# Patient Record
Sex: FEMALE | Race: OTHER | NOT HISPANIC OR LATINO | Employment: UNEMPLOYED | URBAN - METROPOLITAN AREA
[De-identification: names, ages, dates, MRNs, and addresses within clinical notes are randomized per-mention and may not be internally consistent; named-entity substitution may affect disease eponyms.]

---

## 2017-07-20 ENCOUNTER — CONVERSION ENCOUNTER (OUTPATIENT)
Dept: MAMMOGRAPHY | Facility: CLINIC | Age: 67
End: 2017-07-20

## 2019-03-20 ENCOUNTER — TRANSCRIBE ORDERS (OUTPATIENT)
Dept: ADMINISTRATIVE | Facility: HOSPITAL | Age: 69
End: 2019-03-20

## 2019-03-20 ENCOUNTER — APPOINTMENT (OUTPATIENT)
Dept: LAB | Facility: HOSPITAL | Age: 69
End: 2019-03-20
Payer: COMMERCIAL

## 2019-03-20 DIAGNOSIS — Z00.00 ROUTINE GENERAL MEDICAL EXAMINATION AT A HEALTH CARE FACILITY: ICD-10-CM

## 2019-03-20 DIAGNOSIS — Z00.00 ROUTINE GENERAL MEDICAL EXAMINATION AT A HEALTH CARE FACILITY: Primary | ICD-10-CM

## 2019-03-20 LAB
25(OH)D3 SERPL-MCNC: 39.1 NG/ML (ref 30–100)
ALBUMIN SERPL BCP-MCNC: 4.1 G/DL (ref 3–5.2)
ALP SERPL-CCNC: 58 U/L (ref 43–122)
ALT SERPL W P-5'-P-CCNC: 33 U/L (ref 9–52)
ANION GAP SERPL CALCULATED.3IONS-SCNC: 7 MMOL/L (ref 5–14)
AST SERPL W P-5'-P-CCNC: 26 U/L (ref 14–36)
BASOPHILS # BLD AUTO: 0.1 THOUSANDS/ΜL (ref 0–0.1)
BASOPHILS NFR BLD AUTO: 1 % (ref 0–1)
BILIRUB SERPL-MCNC: 0.9 MG/DL
BUN SERPL-MCNC: 12 MG/DL (ref 5–25)
CALCIUM SERPL-MCNC: 9.5 MG/DL (ref 8.4–10.2)
CHLORIDE SERPL-SCNC: 104 MMOL/L (ref 97–108)
CHOLEST SERPL-MCNC: 191 MG/DL
CO2 SERPL-SCNC: 28 MMOL/L (ref 22–30)
CREAT SERPL-MCNC: 0.47 MG/DL (ref 0.6–1.2)
EOSINOPHIL # BLD AUTO: 0.2 THOUSAND/ΜL (ref 0–0.4)
EOSINOPHIL NFR BLD AUTO: 3 % (ref 0–6)
ERYTHROCYTE [DISTWIDTH] IN BLOOD BY AUTOMATED COUNT: 13 %
GFR SERPL CREATININE-BSD FRML MDRD: 102 ML/MIN/1.73SQ M
GLUCOSE P FAST SERPL-MCNC: 99 MG/DL (ref 70–99)
HCT VFR BLD AUTO: 39.2 % (ref 36–46)
HDLC SERPL-MCNC: 45 MG/DL (ref 40–59)
HGB BLD-MCNC: 12.8 G/DL (ref 12–16)
LDLC SERPL CALC-MCNC: 119 MG/DL
LYMPHOCYTES # BLD AUTO: 1.8 THOUSANDS/ΜL (ref 0.5–4)
LYMPHOCYTES NFR BLD AUTO: 29 % (ref 25–45)
MCH RBC QN AUTO: 28.5 PG (ref 26–34)
MCHC RBC AUTO-ENTMCNC: 32.7 G/DL (ref 31–36)
MCV RBC AUTO: 87 FL (ref 80–100)
MONOCYTES # BLD AUTO: 0.4 THOUSAND/ΜL (ref 0.2–0.9)
MONOCYTES NFR BLD AUTO: 6 % (ref 1–10)
NEUTROPHILS # BLD AUTO: 3.8 THOUSANDS/ΜL (ref 1.8–7.8)
NEUTS SEG NFR BLD AUTO: 62 % (ref 45–65)
NONHDLC SERPL-MCNC: 146 MG/DL
PLATELET # BLD AUTO: 251 THOUSANDS/UL (ref 150–450)
PMV BLD AUTO: 8.5 FL (ref 8.9–12.7)
POTASSIUM SERPL-SCNC: 4.1 MMOL/L (ref 3.6–5)
PROT SERPL-MCNC: 7 G/DL (ref 5.9–8.4)
RBC # BLD AUTO: 4.5 MILLION/UL (ref 4–5.2)
SODIUM SERPL-SCNC: 139 MMOL/L (ref 137–147)
TRIGL SERPL-MCNC: 133 MG/DL
WBC # BLD AUTO: 6.1 THOUSAND/UL (ref 4.5–11)

## 2019-03-20 PROCEDURE — 82306 VITAMIN D 25 HYDROXY: CPT

## 2019-03-20 PROCEDURE — 85025 COMPLETE CBC W/AUTO DIFF WBC: CPT

## 2019-03-20 PROCEDURE — 80053 COMPREHEN METABOLIC PANEL: CPT

## 2019-03-20 PROCEDURE — 36415 COLL VENOUS BLD VENIPUNCTURE: CPT

## 2019-03-20 PROCEDURE — 80061 LIPID PANEL: CPT

## 2019-05-30 ENCOUNTER — TRANSCRIBE ORDERS (OUTPATIENT)
Dept: ADMINISTRATIVE | Facility: HOSPITAL | Age: 69
End: 2019-05-30

## 2019-05-30 DIAGNOSIS — Z12.39 BREAST SCREENING, UNSPECIFIED: Primary | ICD-10-CM

## 2019-06-10 ENCOUNTER — HOSPITAL ENCOUNTER (OUTPATIENT)
Dept: MAMMOGRAPHY | Facility: CLINIC | Age: 69
Discharge: HOME | End: 2019-06-10
Payer: COMMERCIAL

## 2019-06-10 VITALS — BODY MASS INDEX: 30.77 KG/M2 | WEIGHT: 156.75 LBS | HEIGHT: 60 IN

## 2019-06-10 DIAGNOSIS — Z12.39 BREAST SCREENING, UNSPECIFIED: ICD-10-CM

## 2019-06-10 PROCEDURE — 77067 SCR MAMMO BI INCL CAD: CPT

## 2019-08-12 PROBLEM — I10 HYPERTENSION: Status: ACTIVE | Noted: 2017-09-11

## 2020-01-13 PROBLEM — R41.3 MEMORY CHANGE: Status: ACTIVE | Noted: 2020-01-13

## 2020-01-13 PROBLEM — R94.31 ABNORMAL EKG: Status: ACTIVE | Noted: 2017-09-11

## 2020-01-16 ENCOUNTER — LAB (OUTPATIENT)
Dept: LAB | Facility: HOSPITAL | Age: 70
End: 2020-01-16
Payer: COMMERCIAL

## 2020-01-16 ENCOUNTER — TRANSCRIBE ORDERS (OUTPATIENT)
Dept: ADMINISTRATIVE | Facility: HOSPITAL | Age: 70
End: 2020-01-16

## 2020-01-16 DIAGNOSIS — I10 ESSENTIAL HYPERTENSION: ICD-10-CM

## 2020-01-16 LAB
ALBUMIN SERPL BCP-MCNC: 3.8 G/DL (ref 3–5.2)
ALP SERPL-CCNC: 55 U/L (ref 43–122)
ALT SERPL W P-5'-P-CCNC: 27 U/L (ref 9–52)
ANION GAP SERPL CALCULATED.3IONS-SCNC: 9 MMOL/L (ref 5–14)
AST SERPL W P-5'-P-CCNC: 24 U/L (ref 14–36)
BASOPHILS # BLD AUTO: 0.1 THOUSANDS/ΜL (ref 0–0.1)
BASOPHILS NFR BLD AUTO: 1 % (ref 0–1)
BILIRUB SERPL-MCNC: 1 MG/DL
BUN SERPL-MCNC: 13 MG/DL (ref 5–25)
CALCIUM SERPL-MCNC: 9.2 MG/DL (ref 8.4–10.2)
CHLORIDE SERPL-SCNC: 105 MMOL/L (ref 97–108)
CHOLEST SERPL-MCNC: 211 MG/DL
CO2 SERPL-SCNC: 25 MMOL/L (ref 22–30)
CREAT SERPL-MCNC: 0.56 MG/DL (ref 0.6–1.2)
EOSINOPHIL # BLD AUTO: 0.2 THOUSAND/ΜL (ref 0–0.4)
EOSINOPHIL NFR BLD AUTO: 4 % (ref 0–6)
ERYTHROCYTE [DISTWIDTH] IN BLOOD BY AUTOMATED COUNT: 13.5 %
GFR SERPL CREATININE-BSD FRML MDRD: 95 ML/MIN/1.73SQ M
GLUCOSE P FAST SERPL-MCNC: 105 MG/DL (ref 70–99)
HCT VFR BLD AUTO: 38.8 % (ref 36–46)
HDLC SERPL-MCNC: 53 MG/DL
HGB BLD-MCNC: 12.7 G/DL (ref 12–16)
LDLC SERPL CALC-MCNC: 131 MG/DL
LYMPHOCYTES # BLD AUTO: 1.8 THOUSANDS/ΜL (ref 0.5–4)
LYMPHOCYTES NFR BLD AUTO: 30 % (ref 25–45)
MCH RBC QN AUTO: 28.5 PG (ref 26–34)
MCHC RBC AUTO-ENTMCNC: 32.8 G/DL (ref 31–36)
MCV RBC AUTO: 87 FL (ref 80–100)
MONOCYTES # BLD AUTO: 0.3 THOUSAND/ΜL (ref 0.2–0.9)
MONOCYTES NFR BLD AUTO: 6 % (ref 1–10)
NEUTROPHILS # BLD AUTO: 3.7 THOUSANDS/ΜL (ref 1.8–7.8)
NEUTS SEG NFR BLD AUTO: 60 % (ref 45–65)
NONHDLC SERPL-MCNC: 158 MG/DL
PLATELET # BLD AUTO: 247 THOUSANDS/UL (ref 150–450)
PMV BLD AUTO: 8.5 FL (ref 8.9–12.7)
POTASSIUM SERPL-SCNC: 4.2 MMOL/L (ref 3.6–5)
PROT SERPL-MCNC: 6.7 G/DL (ref 5.9–8.4)
RBC # BLD AUTO: 4.46 MILLION/UL (ref 4–5.2)
SODIUM SERPL-SCNC: 139 MMOL/L (ref 137–147)
TRIGL SERPL-MCNC: 135 MG/DL
WBC # BLD AUTO: 6.1 THOUSAND/UL (ref 4.5–11)

## 2020-01-16 PROCEDURE — 85025 COMPLETE CBC W/AUTO DIFF WBC: CPT

## 2020-01-16 PROCEDURE — 36415 COLL VENOUS BLD VENIPUNCTURE: CPT

## 2020-01-16 PROCEDURE — 80053 COMPREHEN METABOLIC PANEL: CPT

## 2020-01-16 PROCEDURE — 80061 LIPID PANEL: CPT

## 2020-06-23 ENCOUNTER — HOSPITAL ENCOUNTER (OUTPATIENT)
Dept: RADIOLOGY | Facility: HOSPITAL | Age: 70
Discharge: HOME | End: 2020-06-23
Payer: COMMERCIAL

## 2020-06-23 DIAGNOSIS — M79.672 LEFT FOOT PAIN: ICD-10-CM

## 2020-06-23 PROCEDURE — 73630 X-RAY EXAM OF FOOT: CPT

## 2020-08-14 ENCOUNTER — LAB REQUISITION (OUTPATIENT)
Dept: LAB | Facility: HOSPITAL | Age: 70
End: 2020-08-14
Payer: COMMERCIAL

## 2020-08-14 DIAGNOSIS — I10 ESSENTIAL (PRIMARY) HYPERTENSION: ICD-10-CM

## 2020-08-14 DIAGNOSIS — R73.9 HYPERGLYCEMIA, UNSPECIFIED: ICD-10-CM

## 2020-08-14 DIAGNOSIS — E78.2 MIXED HYPERLIPIDEMIA: ICD-10-CM

## 2020-08-14 LAB
ALBUMIN SERPL BCP-MCNC: 3.8 G/DL (ref 3.5–5)
ALP SERPL-CCNC: 61 U/L (ref 46–116)
ALT SERPL W P-5'-P-CCNC: 24 U/L (ref 12–78)
ANION GAP SERPL CALCULATED.3IONS-SCNC: 4 MMOL/L (ref 4–13)
AST SERPL W P-5'-P-CCNC: 17 U/L (ref 5–45)
BASOPHILS # BLD AUTO: 0.06 THOUSANDS/ΜL (ref 0–0.1)
BASOPHILS NFR BLD AUTO: 1 % (ref 0–1)
BILIRUB SERPL-MCNC: 0.88 MG/DL (ref 0.2–1)
BUN SERPL-MCNC: 14 MG/DL (ref 5–25)
CALCIUM SERPL-MCNC: 8.7 MG/DL (ref 8.3–10.1)
CHLORIDE SERPL-SCNC: 110 MMOL/L (ref 100–108)
CHOLEST SERPL-MCNC: 217 MG/DL (ref 50–200)
CK SERPL-CCNC: 125 U/L (ref 26–192)
CO2 SERPL-SCNC: 29 MMOL/L (ref 21–32)
CREAT SERPL-MCNC: 0.66 MG/DL (ref 0.6–1.3)
EOSINOPHIL # BLD AUTO: 0.2 THOUSAND/ΜL (ref 0–0.61)
EOSINOPHIL NFR BLD AUTO: 3 % (ref 0–6)
ERYTHROCYTE [DISTWIDTH] IN BLOOD BY AUTOMATED COUNT: 13.1 % (ref 11.6–15.1)
EST. AVERAGE GLUCOSE BLD GHB EST-MCNC: 111 MG/DL
GFR SERPL CREATININE-BSD FRML MDRD: 90 ML/MIN/1.73SQ M
GLUCOSE SERPL-MCNC: 99 MG/DL (ref 65–140)
HBA1C MFR BLD: 5.5 %
HCT VFR BLD AUTO: 39.5 % (ref 34.8–46.1)
HDLC SERPL-MCNC: 61 MG/DL
HGB BLD-MCNC: 12.6 G/DL (ref 11.5–15.4)
IMM GRANULOCYTES # BLD AUTO: 0.02 THOUSAND/UL (ref 0–0.2)
IMM GRANULOCYTES NFR BLD AUTO: 0 % (ref 0–2)
LDLC SERPL CALC-MCNC: 131 MG/DL (ref 0–100)
LYMPHOCYTES # BLD AUTO: 2.07 THOUSANDS/ΜL (ref 0.6–4.47)
LYMPHOCYTES NFR BLD AUTO: 31 % (ref 14–44)
MCH RBC QN AUTO: 28.7 PG (ref 26.8–34.3)
MCHC RBC AUTO-ENTMCNC: 31.9 G/DL (ref 31.4–37.4)
MCV RBC AUTO: 90 FL (ref 82–98)
MONOCYTES # BLD AUTO: 0.43 THOUSAND/ΜL (ref 0.17–1.22)
MONOCYTES NFR BLD AUTO: 6 % (ref 4–12)
NEUTROPHILS # BLD AUTO: 3.89 THOUSANDS/ΜL (ref 1.85–7.62)
NEUTS SEG NFR BLD AUTO: 59 % (ref 43–75)
NONHDLC SERPL-MCNC: 156 MG/DL
NRBC BLD AUTO-RTO: 0 /100 WBCS
PLATELET # BLD AUTO: 247 THOUSANDS/UL (ref 149–390)
PMV BLD AUTO: 10.4 FL (ref 8.9–12.7)
POTASSIUM SERPL-SCNC: 4.5 MMOL/L (ref 3.5–5.3)
PROT SERPL-MCNC: 6.8 G/DL (ref 6.4–8.2)
RBC # BLD AUTO: 4.39 MILLION/UL (ref 3.81–5.12)
SODIUM SERPL-SCNC: 143 MMOL/L (ref 136–145)
TRIGL SERPL-MCNC: 124 MG/DL
WBC # BLD AUTO: 6.67 THOUSAND/UL (ref 4.31–10.16)

## 2020-08-14 PROCEDURE — 82550 ASSAY OF CK (CPK): CPT | Performed by: FAMILY MEDICINE

## 2020-08-14 PROCEDURE — 83036 HEMOGLOBIN GLYCOSYLATED A1C: CPT | Performed by: FAMILY MEDICINE

## 2020-08-14 PROCEDURE — 80053 COMPREHEN METABOLIC PANEL: CPT | Performed by: FAMILY MEDICINE

## 2020-08-14 PROCEDURE — 85025 COMPLETE CBC W/AUTO DIFF WBC: CPT | Performed by: FAMILY MEDICINE

## 2020-08-14 PROCEDURE — 80061 LIPID PANEL: CPT | Performed by: FAMILY MEDICINE

## 2021-02-17 ENCOUNTER — HOSPITAL ENCOUNTER (OUTPATIENT)
Dept: MAMMOGRAPHY | Facility: CLINIC | Age: 71
Discharge: HOME | End: 2021-02-17
Payer: COMMERCIAL

## 2021-02-17 VITALS — HEIGHT: 59 IN | BODY MASS INDEX: 31.85 KG/M2 | WEIGHT: 158 LBS

## 2021-02-17 DIAGNOSIS — Z12.31 OTHER SCREENING MAMMOGRAM: ICD-10-CM

## 2021-02-17 DIAGNOSIS — Z12.31 ENCOUNTER FOR SCREENING MAMMOGRAM FOR MALIGNANT NEOPLASM OF BREAST: ICD-10-CM

## 2021-02-17 PROCEDURE — 77067 SCR MAMMO BI INCL CAD: CPT

## 2021-02-17 PROCEDURE — 77063 BREAST TOMOSYNTHESIS BI: CPT

## 2021-03-23 PROBLEM — I73.9 PERIPHERAL VASCULAR DISEASE, UNSPECIFIED (HCC): Status: ACTIVE | Noted: 2021-03-23

## 2021-07-30 ENCOUNTER — LAB (OUTPATIENT)
Dept: LAB | Facility: HOSPITAL | Age: 71
End: 2021-07-30
Payer: COMMERCIAL

## 2021-07-30 DIAGNOSIS — E78.2 MIXED HYPERLIPIDEMIA: ICD-10-CM

## 2021-07-30 DIAGNOSIS — I10 ESSENTIAL HYPERTENSION: ICD-10-CM

## 2021-07-30 LAB
ALBUMIN SERPL BCP-MCNC: 3.7 G/DL (ref 3.5–5)
ALP SERPL-CCNC: 67 U/L (ref 46–116)
ALT SERPL W P-5'-P-CCNC: 28 U/L (ref 12–78)
ANION GAP SERPL CALCULATED.3IONS-SCNC: 6 MMOL/L (ref 4–13)
AST SERPL W P-5'-P-CCNC: 20 U/L (ref 5–45)
BASOPHILS # BLD AUTO: 0.06 THOUSANDS/ΜL (ref 0–0.1)
BASOPHILS NFR BLD AUTO: 1 % (ref 0–1)
BILIRUB SERPL-MCNC: 1.08 MG/DL (ref 0.2–1)
BUN SERPL-MCNC: 13 MG/DL (ref 5–25)
CALCIUM SERPL-MCNC: 9.1 MG/DL (ref 8.3–10.1)
CHLORIDE SERPL-SCNC: 108 MMOL/L (ref 100–108)
CHOLEST SERPL-MCNC: 210 MG/DL (ref 50–200)
CO2 SERPL-SCNC: 28 MMOL/L (ref 21–32)
CREAT SERPL-MCNC: 0.58 MG/DL (ref 0.6–1.3)
EOSINOPHIL # BLD AUTO: 0.13 THOUSAND/ΜL (ref 0–0.61)
EOSINOPHIL NFR BLD AUTO: 2 % (ref 0–6)
ERYTHROCYTE [DISTWIDTH] IN BLOOD BY AUTOMATED COUNT: 13.3 % (ref 11.6–15.1)
GFR SERPL CREATININE-BSD FRML MDRD: 94 ML/MIN/1.73SQ M
GLUCOSE SERPL-MCNC: 114 MG/DL (ref 65–140)
HCT VFR BLD AUTO: 44.3 % (ref 34.8–46.1)
HDLC SERPL-MCNC: 66 MG/DL
HGB BLD-MCNC: 13.7 G/DL (ref 11.5–15.4)
IMM GRANULOCYTES # BLD AUTO: 0.02 THOUSAND/UL (ref 0–0.2)
IMM GRANULOCYTES NFR BLD AUTO: 0 % (ref 0–2)
LDLC SERPL CALC-MCNC: 120 MG/DL (ref 0–100)
LYMPHOCYTES # BLD AUTO: 1.93 THOUSANDS/ΜL (ref 0.6–4.47)
LYMPHOCYTES NFR BLD AUTO: 29 % (ref 14–44)
MCH RBC QN AUTO: 28.4 PG (ref 26.8–34.3)
MCHC RBC AUTO-ENTMCNC: 30.9 G/DL (ref 31.4–37.4)
MCV RBC AUTO: 92 FL (ref 82–98)
MONOCYTES # BLD AUTO: 0.4 THOUSAND/ΜL (ref 0.17–1.22)
MONOCYTES NFR BLD AUTO: 6 % (ref 4–12)
NEUTROPHILS # BLD AUTO: 4.14 THOUSANDS/ΜL (ref 1.85–7.62)
NEUTS SEG NFR BLD AUTO: 62 % (ref 43–75)
NONHDLC SERPL-MCNC: 144 MG/DL
NRBC BLD AUTO-RTO: 0 /100 WBCS
PLATELET # BLD AUTO: 257 THOUSANDS/UL (ref 149–390)
PMV BLD AUTO: 10.6 FL (ref 8.9–12.7)
POTASSIUM SERPL-SCNC: 5.2 MMOL/L (ref 3.5–5.3)
PROT SERPL-MCNC: 6.8 G/DL (ref 6.4–8.2)
RBC # BLD AUTO: 4.83 MILLION/UL (ref 3.81–5.12)
SODIUM SERPL-SCNC: 142 MMOL/L (ref 136–145)
TRIGL SERPL-MCNC: 122 MG/DL
WBC # BLD AUTO: 6.68 THOUSAND/UL (ref 4.31–10.16)

## 2021-07-30 PROCEDURE — 80053 COMPREHEN METABOLIC PANEL: CPT

## 2021-07-30 PROCEDURE — 80061 LIPID PANEL: CPT

## 2021-07-30 PROCEDURE — 85025 COMPLETE CBC W/AUTO DIFF WBC: CPT

## 2021-07-30 PROCEDURE — 36415 COLL VENOUS BLD VENIPUNCTURE: CPT

## 2021-11-16 ENCOUNTER — HOSPITAL ENCOUNTER (OUTPATIENT)
Dept: RADIOLOGY | Facility: HOSPITAL | Age: 71
Discharge: HOME | End: 2021-11-16
Payer: COMMERCIAL

## 2021-11-16 ENCOUNTER — HOSPITAL ENCOUNTER (OUTPATIENT)
Dept: BONE DENSITY | Facility: CLINIC | Age: 71
Discharge: HOME | End: 2021-11-16
Payer: COMMERCIAL

## 2021-11-16 DIAGNOSIS — M25.559 HIP PAIN: ICD-10-CM

## 2021-11-16 DIAGNOSIS — M54.42 LOW BACK PAIN WITH LEFT-SIDED SCIATICA, UNSPECIFIED BACK PAIN LATERALITY, UNSPECIFIED CHRONICITY: ICD-10-CM

## 2021-11-16 DIAGNOSIS — M81.0 OSTEOPOROSIS, UNSPECIFIED OSTEOPOROSIS TYPE, UNSPECIFIED PATHOLOGICAL FRACTURE PRESENCE: ICD-10-CM

## 2021-11-16 DIAGNOSIS — Z13.820 SCREENING FOR OSTEOPOROSIS: ICD-10-CM

## 2021-11-16 PROCEDURE — 73502 X-RAY EXAM HIP UNI 2-3 VIEWS: CPT

## 2021-11-16 PROCEDURE — 77080 DXA BONE DENSITY AXIAL: CPT

## 2021-11-16 PROCEDURE — 72110 X-RAY EXAM L-2 SPINE 4/>VWS: CPT

## 2022-04-21 ENCOUNTER — LAB (OUTPATIENT)
Dept: LAB | Facility: HOSPITAL | Age: 72
End: 2022-04-21
Payer: MEDICARE

## 2022-04-21 DIAGNOSIS — R73.9 HYPERGLYCEMIA: ICD-10-CM

## 2022-04-21 DIAGNOSIS — I10 ESSENTIAL HYPERTENSION: ICD-10-CM

## 2022-04-21 DIAGNOSIS — E78.2 MIXED HYPERLIPIDEMIA: ICD-10-CM

## 2022-04-21 LAB
ALBUMIN SERPL BCP-MCNC: 4.1 G/DL (ref 3–5.2)
ALP SERPL-CCNC: 57 U/L (ref 43–122)
ALT SERPL W P-5'-P-CCNC: 26 U/L
ANION GAP SERPL CALCULATED.3IONS-SCNC: 8 MMOL/L (ref 5–14)
AST SERPL W P-5'-P-CCNC: 30 U/L (ref 14–36)
BASOPHILS # BLD AUTO: 0.06 THOUSANDS/ΜL (ref 0–0.1)
BASOPHILS NFR BLD AUTO: 1 % (ref 0–1)
BILIRUB SERPL-MCNC: 1.34 MG/DL
BUN SERPL-MCNC: 13 MG/DL (ref 5–25)
CALCIUM SERPL-MCNC: 8.9 MG/DL (ref 8.4–10.2)
CHLORIDE SERPL-SCNC: 104 MMOL/L (ref 97–108)
CHOLEST SERPL-MCNC: 188 MG/DL
CO2 SERPL-SCNC: 29 MMOL/L (ref 22–30)
CREAT SERPL-MCNC: 0.55 MG/DL (ref 0.6–1.2)
EOSINOPHIL # BLD AUTO: 0.18 THOUSAND/ΜL (ref 0–0.61)
EOSINOPHIL NFR BLD AUTO: 3 % (ref 0–6)
ERYTHROCYTE [DISTWIDTH] IN BLOOD BY AUTOMATED COUNT: 13.2 % (ref 11.6–15.1)
EST. AVERAGE GLUCOSE BLD GHB EST-MCNC: 114 MG/DL
GFR SERPL CREATININE-BSD FRML MDRD: 94 ML/MIN/1.73SQ M
GLUCOSE P FAST SERPL-MCNC: 100 MG/DL (ref 70–99)
HBA1C MFR BLD: 5.6 %
HCT VFR BLD AUTO: 42 % (ref 34.8–46.1)
HDLC SERPL-MCNC: 52 MG/DL
HGB BLD-MCNC: 13.4 G/DL (ref 11.5–15.4)
IMM GRANULOCYTES # BLD AUTO: 0.02 THOUSAND/UL (ref 0–0.2)
IMM GRANULOCYTES NFR BLD AUTO: 0 % (ref 0–2)
LDLC SERPL CALC-MCNC: 114 MG/DL
LYMPHOCYTES # BLD AUTO: 2.02 THOUSANDS/ΜL (ref 0.6–4.47)
LYMPHOCYTES NFR BLD AUTO: 31 % (ref 14–44)
MCH RBC QN AUTO: 28.2 PG (ref 26.8–34.3)
MCHC RBC AUTO-ENTMCNC: 31.9 G/DL (ref 31.4–37.4)
MCV RBC AUTO: 88 FL (ref 82–98)
MONOCYTES # BLD AUTO: 0.32 THOUSAND/ΜL (ref 0.17–1.22)
MONOCYTES NFR BLD AUTO: 5 % (ref 4–12)
NEUTROPHILS # BLD AUTO: 4 THOUSANDS/ΜL (ref 1.85–7.62)
NEUTS SEG NFR BLD AUTO: 60 % (ref 43–75)
NONHDLC SERPL-MCNC: 136 MG/DL
NRBC BLD AUTO-RTO: 0 /100 WBCS
PLATELET # BLD AUTO: 268 THOUSANDS/UL (ref 149–390)
PMV BLD AUTO: 10.1 FL (ref 8.9–12.7)
POTASSIUM SERPL-SCNC: 4.3 MMOL/L (ref 3.6–5)
PROT SERPL-MCNC: 7.3 G/DL (ref 5.9–8.4)
RBC # BLD AUTO: 4.76 MILLION/UL (ref 3.81–5.12)
SODIUM SERPL-SCNC: 141 MMOL/L (ref 137–147)
TRIGL SERPL-MCNC: 108 MG/DL
WBC # BLD AUTO: 6.6 THOUSAND/UL (ref 4.31–10.16)

## 2022-04-21 PROCEDURE — 85025 COMPLETE CBC W/AUTO DIFF WBC: CPT

## 2022-04-21 PROCEDURE — 83036 HEMOGLOBIN GLYCOSYLATED A1C: CPT

## 2022-04-21 PROCEDURE — 80053 COMPREHEN METABOLIC PANEL: CPT

## 2022-04-21 PROCEDURE — 80061 LIPID PANEL: CPT

## 2022-04-21 PROCEDURE — 36415 COLL VENOUS BLD VENIPUNCTURE: CPT

## 2022-04-25 ENCOUNTER — HOSPITAL ENCOUNTER (OUTPATIENT)
Dept: MAMMOGRAPHY | Facility: MEDICAL CENTER | Age: 72
Discharge: HOME | End: 2022-04-25
Payer: MEDICARE

## 2022-04-25 VITALS — HEIGHT: 59 IN | BODY MASS INDEX: 31.87 KG/M2 | WEIGHT: 158.07 LBS

## 2022-04-25 DIAGNOSIS — Z12.31 OTHER SCREENING MAMMOGRAM: ICD-10-CM

## 2022-04-25 PROCEDURE — 77067 SCR MAMMO BI INCL CAD: CPT

## 2022-04-25 PROCEDURE — 77063 BREAST TOMOSYNTHESIS BI: CPT

## 2022-12-26 ENCOUNTER — HOSPITAL ENCOUNTER (EMERGENCY)
Facility: HOSPITAL | Age: 72
Discharge: HOME | End: 2022-12-27
Attending: EMERGENCY MEDICINE

## 2022-12-26 ENCOUNTER — APPOINTMENT (EMERGENCY)
Dept: RADIOLOGY | Facility: HOSPITAL | Age: 72
End: 2022-12-26

## 2022-12-26 VITALS
TEMPERATURE: 98.1 F | SYSTOLIC BLOOD PRESSURE: 158 MMHG | OXYGEN SATURATION: 98 % | RESPIRATION RATE: 18 BRPM | DIASTOLIC BLOOD PRESSURE: 74 MMHG | HEART RATE: 80 BPM

## 2022-12-26 DIAGNOSIS — S42.343A: Primary | ICD-10-CM

## 2022-12-26 LAB
ANION GAP SERPL CALCULATED.3IONS-SCNC: 8 MMOL/L (ref 4–13)
APTT PPP: 27 SECONDS (ref 23–37)
BASOPHILS # BLD AUTO: 0.05 THOUSANDS/ÂΜL (ref 0–0.1)
BASOPHILS NFR BLD AUTO: 1 % (ref 0–1)
BUN SERPL-MCNC: 25 MG/DL (ref 5–25)
CALCIUM SERPL-MCNC: 8.8 MG/DL (ref 8.3–10.1)
CHLORIDE SERPL-SCNC: 104 MMOL/L (ref 96–108)
CO2 SERPL-SCNC: 28 MMOL/L (ref 21–32)
CREAT SERPL-MCNC: 0.82 MG/DL (ref 0.6–1.3)
EOSINOPHIL # BLD AUTO: 0.18 THOUSAND/ÂΜL (ref 0–0.61)
EOSINOPHIL NFR BLD AUTO: 2 % (ref 0–6)
ERYTHROCYTE [DISTWIDTH] IN BLOOD BY AUTOMATED COUNT: 12.9 % (ref 11.6–15.1)
GFR SERPL CREATININE-BSD FRML MDRD: 72 ML/MIN/1.73SQ M
GLUCOSE SERPL-MCNC: 121 MG/DL (ref 65–140)
HCT VFR BLD AUTO: 36.4 % (ref 34.8–46.1)
HGB BLD-MCNC: 12 G/DL (ref 11.5–15.4)
IMM GRANULOCYTES # BLD AUTO: 0.04 THOUSAND/UL (ref 0–0.2)
IMM GRANULOCYTES NFR BLD AUTO: 0 % (ref 0–2)
INR PPP: 1.05 (ref 0.84–1.19)
LYMPHOCYTES # BLD AUTO: 2.51 THOUSANDS/ÂΜL (ref 0.6–4.47)
LYMPHOCYTES NFR BLD AUTO: 28 % (ref 14–44)
MCH RBC QN AUTO: 28.9 PG (ref 26.8–34.3)
MCHC RBC AUTO-ENTMCNC: 33 G/DL (ref 31.4–37.4)
MCV RBC AUTO: 88 FL (ref 82–98)
MONOCYTES # BLD AUTO: 0.49 THOUSAND/ÂΜL (ref 0.17–1.22)
MONOCYTES NFR BLD AUTO: 5 % (ref 4–12)
NEUTROPHILS # BLD AUTO: 5.73 THOUSANDS/ÂΜL (ref 1.85–7.62)
NEUTS SEG NFR BLD AUTO: 64 % (ref 43–75)
NRBC BLD AUTO-RTO: 0 /100 WBCS
PLATELET # BLD AUTO: 240 THOUSANDS/UL (ref 149–390)
PMV BLD AUTO: 9.5 FL (ref 8.9–12.7)
POTASSIUM SERPL-SCNC: 3.5 MMOL/L (ref 3.5–5.3)
PROTHROMBIN TIME: 13.7 SECONDS (ref 11.6–14.5)
RBC # BLD AUTO: 4.15 MILLION/UL (ref 3.81–5.12)
SODIUM SERPL-SCNC: 140 MMOL/L (ref 135–147)
WBC # BLD AUTO: 9 THOUSAND/UL (ref 4.31–10.16)

## 2022-12-26 RX ORDER — GINSENG 100 MG
1 CAPSULE ORAL ONCE
Status: COMPLETED | OUTPATIENT
Start: 2022-12-27 | End: 2022-12-27

## 2022-12-26 RX ORDER — ACETAMINOPHEN 325 MG/1
975 TABLET ORAL ONCE
Status: COMPLETED | OUTPATIENT
Start: 2022-12-26 | End: 2022-12-26

## 2022-12-26 RX ADMIN — ACETAMINOPHEN 975 MG: 325 TABLET, FILM COATED ORAL at 22:48

## 2022-12-26 RX ADMIN — TETANUS TOXOID, REDUCED DIPHTHERIA TOXOID AND ACELLULAR PERTUSSIS VACCINE, ADSORBED 0.5 ML: 5; 2.5; 8; 8; 2.5 SUSPENSION INTRAMUSCULAR at 22:45

## 2022-12-27 RX ORDER — OXYCODONE HYDROCHLORIDE 5 MG/1
5 TABLET ORAL EVERY 6 HOURS PRN
Qty: 12 TABLET | Refills: 0 | Status: SHIPPED | OUTPATIENT
Start: 2022-12-27 | End: 2023-01-06

## 2022-12-27 RX ORDER — ACETAMINOPHEN 500 MG
500 TABLET ORAL EVERY 6 HOURS PRN
Qty: 30 TABLET | Refills: 0 | Status: SHIPPED | OUTPATIENT
Start: 2022-12-27

## 2022-12-27 RX ORDER — OXYCODONE HYDROCHLORIDE 5 MG/1
5 TABLET ORAL ONCE
Status: COMPLETED | OUTPATIENT
Start: 2022-12-27 | End: 2022-12-27

## 2022-12-27 RX ADMIN — BACITRACIN ZINC 1 SMALL APPLICATION: 500 OINTMENT TOPICAL at 00:02

## 2022-12-27 RX ADMIN — OXYCODONE HYDROCHLORIDE 5 MG: 5 TABLET ORAL at 01:06

## 2022-12-27 NOTE — ED PROVIDER NOTES
History  Chief Complaint   Patient presents with   • Fall     Per EMS pt tripped and fell on steps, complain of right arm pain  Pt reports no LOC no thinners and no dizziness     The patient is a 71 YOF with hx HTN, PVD who presents tot he ED for evaluation of right arm pain and deformity that began just prior to arrival when she fell while walking down the stairs, landing on the right arm and shoulder  She reports that it was a mechanical fall, and denies head strike, LOC, or any other complaints  She did scrape her right cheek against a fence which was beside her while falling  Family that witnessed fall at bedside reports that she fell forwards onto the arm  She otherwise denies numbness, paresthesia, neck pain, back pain, chest pain, dyspnea, abdominal pain, other extremity injury, vomiting, dizziness  Family at bedside translates for patient  Unsure if tetanus is UTD  Prior to Admission Medications   Prescriptions Last Dose Informant Patient Reported? Taking?    Alcohol Swabs (Alcohol Pads) 70 % PADS   No Yes   Sig: USE AS DIRECTED THREE TIMES A DAY   Cholecalciferol (Vitamin D3) 50 MCG (2000 UT) capsule   No Yes   Sig: Take 1 capsule (2,000 Units total) by mouth daily   Easy Comfort Lancets MISC Not Taking  No No   Sig: USE TO TEST THE BLOOD SUGAR TWICE A DAY   Patient not taking: Reported on 12/26/2022   FREESTYLE LITE test strip   No Yes   Sig: USE TO TEST THE BLOOD SUGAR TWICE A DAY   Vascepa 1 g CAPS   No Yes   Sig: TAKE TWO CAPSULES BY MOUTH TWICE A DAY   Vitamin D, Ergocalciferol, 50 MCG (2000 UT) CAPS Not Taking  No No   Sig: Take 1 capsule by mouth daily   Patient not taking: Reported on 12/26/2022   b complex vitamins capsule   No Yes   Sig: Take 1 capsule by mouth daily   benzonatate (TESSALON PERLES) 100 mg capsule Not Taking  No No   Sig: Take 1 capsule (100 mg total) by mouth 3 (three) times a day as needed for cough   Patient not taking: Reported on 12/26/2022 bisoprolol-hydrochlorothiazide (ZIAC) 5-6 25 MG per tablet   No Yes   Sig: Take 1 tablet by mouth every 24 hours   calcium carbonate-vitamin D (OSCAL-D) 500 mg-200 units per tablet   No Yes   Sig: TAKE ONE TABLET BY MOUTH EVERY 12 HOURS   calcium carbonate-vitamin D (OSCAL-D) 500 mg-200 units per tablet   No Yes   Sig: Take 1 tablet by mouth 2 (two) times a day with meals   calcium-vitamin D (OSCAL) 250-125 MG-UNIT per tablet   No Yes   Sig: Take 1 tablet by mouth daily   clotrimazole-betamethasone (LOTRISONE) 1-0 05 % cream Not Taking  No No   Sig: APPLY TOPICALLY TO AFFECTED AREA TWICE A DAY   Patient not taking: Reported on 2022   cyclobenzaprine (FLEXERIL) 5 mg tablet Not Taking  No No   Sig: Take 1 tablet (5 mg total) by mouth 3 (three) times a day as needed for muscle spasms for up to 30 doses   Patient not taking: Reported on 2022   fluticasone (FLONASE) 50 mcg/act nasal spray Not Taking  No No   Si spray into each nostril daily   Patient not taking: Reported on 2022   loratadine (CLARITIN) 10 mg tablet Not Taking  No No   Sig: Take 1 tablet (10 mg total) by mouth daily   Patient not taking: Reported on 2022   magnesium oxide (MAG-OX) 400 mg tablet Not Taking  No No   Sig: Take 1 tablet (400 mg total) by mouth daily   Patient not taking: Reported on 2022   meloxicam (MOBIC) 15 mg tablet Not Taking  No No   Sig: Take 1 tablet (15 mg total) by mouth daily   Patient not taking: Reported on 2022   methocarbamol (ROBAXIN) 500 mg tablet Not Taking  No No   Sig: Take 1 tablet (500 mg total) by mouth 3 (three) times a day   Patient not taking: Reported on 2022   methylPREDNISolone 4 MG tablet therapy pack Not Taking  No No   Sig: Use as directed on package   Patient not taking: Reported on 2022   naproxen (NAPROSYN) 500 mg tablet Not Taking  No No   Sig: Take 1 tablet (500 mg total) by mouth 2 (two) times a day with meals   Patient not taking: Reported on 12/26/2022   triamcinolone (KENALOG) 0 1 % lotion Not Taking  No No   Sig: APPLY TOPICALLY TO AFFECTED AREA THREE TIMES A DAY   Patient not taking: Reported on 12/26/2022      Facility-Administered Medications: None       Past Medical History:   Diagnosis Date   • No known health problems     NO RELEVANT PAST MEDICAL HX       History reviewed  No pertinent surgical history  Family History   Problem Relation Age of Onset   • Heart disease Mother         CARDIOVASCULAR DISEASE: COD   • Lung disease Father         COD   • Heart attack Brother         MYOCARDIAL INFARCTION: COD   • Heart disease Brother         CARDIOVASCULAR DISEASE   • Arrhythmia Brother         PACEMAKER   • Arrhythmia Brother         PACEMAKER   • Aortic aneurysm Sister         ABDOMINAL: COD   • Aortic aneurysm Sister         ABDOMINAL   • No Known Problems Maternal Grandmother    • No Known Problems Maternal Grandfather    • No Known Problems Paternal Grandmother    • No Known Problems Paternal Grandfather      I have reviewed and agree with the history as documented  E-Cigarette/Vaping     E-Cigarette/Vaping Substances     Social History     Tobacco Use   • Smoking status: Some Days     Types: Cigarettes   • Smokeless tobacco: Current   • Tobacco comments:     TOBACCO USE       Review of Systems   Constitutional: Negative for chills and fever  HENT: Negative for congestion and rhinorrhea  Respiratory: Negative for cough and shortness of breath  Cardiovascular: Negative for chest pain and leg swelling  Gastrointestinal: Negative for abdominal pain, constipation, diarrhea, nausea and vomiting  Genitourinary: Negative for dysuria and flank pain  Musculoskeletal: Positive for arthralgias  Negative for myalgias  Skin: Negative for rash and wound  Neurological: Negative for dizziness, weakness, numbness and headaches  Psychiatric/Behavioral: Negative for behavioral problems         Physical Exam  Physical Exam  Vitals and nursing note reviewed  Constitutional:       General: She is not in acute distress  Appearance: She is well-developed  HENT:      Head: Normocephalic and atraumatic  Eyes:      Extraocular Movements: Extraocular movements intact  Conjunctiva/sclera: Conjunctivae normal       Pupils: Pupils are equal, round, and reactive to light  Cardiovascular:      Rate and Rhythm: Normal rate and regular rhythm  Heart sounds: No murmur heard  Pulmonary:      Effort: Pulmonary effort is normal  No respiratory distress  Breath sounds: Normal breath sounds  Chest:      Chest wall: No tenderness  Abdominal:      Palpations: Abdomen is soft  Tenderness: There is no abdominal tenderness  There is no guarding or rebound  Musculoskeletal:         General: Swelling, tenderness and deformity present  Right shoulder: Deformity, tenderness and bony tenderness present  Left shoulder: Normal       Right upper arm: Bony tenderness present  Left upper arm: Normal       Right elbow: Tenderness present  Left elbow: Normal       Right forearm: No bony tenderness  Left forearm: Normal       Right wrist: Bony tenderness present  Normal pulse  Left wrist: Normal  Normal pulse  Right hand: No bony tenderness  Normal range of motion  There is no disruption of two-point discrimination  Normal capillary refill  Normal pulse  Left hand: Normal  There is no disruption of two-point discrimination  Normal capillary refill  Normal pulse  Cervical back: Normal range of motion and neck supple  No tenderness or bony tenderness  Thoracic back: No bony tenderness  Lumbar back: No bony tenderness  Right lower leg: No edema  Left lower leg: No edema  Comments: Distal pulses 2+ in all extremities  No hip, leg, knee, ankle, or foot TTP  Skin:     General: Skin is warm and dry  Capillary Refill: Capillary refill takes less than 2 seconds        Comments: Abrasion to right cheek, bleeding controlled   Neurological:      General: No focal deficit present  Mental Status: She is alert and oriented to person, place, and time  Cranial Nerves: No cranial nerve deficit  Sensory: No sensory deficit  Motor: No weakness     Psychiatric:         Mood and Affect: Mood normal          Vital Signs  ED Triage Vitals   Temperature Pulse Respirations Blood Pressure SpO2   12/26/22 2213 12/26/22 2213 12/26/22 2213 12/26/22 2213 12/26/22 2213   98 1 °F (36 7 °C) 80 18 158/74 98 %      Temp Source Heart Rate Source Patient Position - Orthostatic VS BP Location FiO2 (%)   12/26/22 2213 12/26/22 2213 12/26/22 2213 12/26/22 2213 --   Oral Monitor Lying Left arm       Pain Score       12/27/22 0106       10 - Worst Possible Pain           Vitals:    12/26/22 2213   BP: 158/74   Pulse: 80   Patient Position - Orthostatic VS: Lying         Visual Acuity      ED Medications  Medications   acetaminophen (TYLENOL) tablet 975 mg (975 mg Oral Given 12/26/22 2248)   tetanus-diphtheria-acellular pertussis (BOOSTRIX) IM injection 0 5 mL (0 5 mL Intramuscular Given 12/26/22 2245)   bacitracin topical ointment 1 small application (1 small application Topical Given 12/27/22 0002)   oxyCODONE (ROXICODONE) IR tablet 5 mg (5 mg Oral Given 12/27/22 0106)       Diagnostic Studies  Results Reviewed     Procedure Component Value Units Date/Time    Protime-INR [[id number]]  (Normal) Collected: 12/26/22 2245    Lab Status: Final result Specimen: Blood from Arm, Left Updated: 12/26/22 2306     Protime 13 7 seconds      INR 1 05    APTT [[id number]]  (Normal) Collected: 12/26/22 2245    Lab Status: Final result Specimen: Blood from Arm, Left Updated: 12/26/22 2306     PTT 27 seconds     Basic metabolic panel [[id number]] Collected: 12/26/22 2245    Lab Status: Final result Specimen: Blood from Arm, Left Updated: 12/26/22 2306     Sodium 140 mmol/L      Potassium 3 5 mmol/L      Chloride 104 mmol/L CO2 28 mmol/L      ANION GAP 8 mmol/L      BUN 25 mg/dL      Creatinine 0 82 mg/dL      Glucose 121 mg/dL      Calcium 8 8 mg/dL      eGFR 72 ml/min/1 73sq m     Narrative:      [place] guidelines for Chronic Kidney Disease (CKD):   •  Stage 1 with normal or high GFR (GFR > 90 mL/min/1 73 square meters)  •  Stage 2 Mild CKD (GFR = 60-89 mL/min/1 73 square meters)  •  Stage 3A Moderate CKD (GFR = 45-59 mL/min/1 73 square meters)  •  Stage 3B Moderate CKD (GFR = 30-44 mL/min/1 73 square meters)  •  Stage 4 Severe CKD (GFR = 15-29 mL/min/1 73 square meters)  •  Stage 5 End Stage CKD (GFR <15 mL/min/1 73 square meters)  Note: GFR calculation is accurate only with a steady state creatinine    CBC and differential [[id number]] Collected: 12/26/22 2245    Lab Status: Final result Specimen: Blood from Arm, Left Updated: 12/26/22 2252     WBC 9 00 Thousand/uL      RBC 4 15 Million/uL      Hemoglobin 12 0 g/dL      Hematocrit 36 4 %      MCV 88 fL      MCH 28 9 pg      MCHC 33 0 g/dL      RDW 12 9 %      MPV 9 5 fL      Platelets 873 Thousands/uL      nRBC 0 /100 WBCs      Neutrophils Relative 64 %      Immat GRANS % 0 %      Lymphocytes Relative 28 %      Monocytes Relative 5 %      Eosinophils Relative 2 %      Basophils Relative 1 %      Neutrophils Absolute 5 73 Thousands/µL      Immature Grans Absolute 0 04 Thousand/uL      Lymphocytes Absolute 2 51 Thousands/µL      Monocytes Absolute 0 49 Thousand/µL      Eosinophils Absolute 0 18 Thousand/µL      Basophils Absolute 0 05 Thousands/µL                  XR shoulder 2+ views RIGHT   ED Interpretation by Noemi Anglin PA-C (12/26 2327)   Spiral fracture to shaft of humerus  No other obvious fracture or dislocation as interpreted by me        XR humerus RIGHT   ED Interpretation by Noemi Anglin PA-C (12/26 2327)   Spiral fracture to shaft of humerus   No other obvious fracture or dislocation as interpreted by me      XR elbow 2 vw right   ED Interpretation by Mikhail Rodriguez PA-C (12/26 2327)   Spiral fracture to shaft of humerus  No other obvious fracture or dislocation as interpreted by me        XR wrist 2 vw right   ED Interpretation by Mikhail Rodriguez PA-C (12/26 8188)   No obvious fracture or dislocation as interpreted by me                 Procedures  Orthopedic injury treatment    Date/Time: 12/27/2022 12:30 AM  Performed by: Mikhail Rodriguez PA-C  Authorized by: Mikhail Rodriguez PA-C     Patient Location:  ED  [place] Protocol:  Procedure performed by: (ED Technicians )  Consent: Verbal consent obtained    Risks and benefits: risks, benefits and alternatives were discussed  Consent given by: patient  Patient understanding: patient states understanding of the procedure being performed  Patient consent: the patient's understanding of the procedure matches consent given  Patient identity confirmed: verbally with patient      Injury location:  Upper arm  Location details:  Right upper arm  Injury type:  Fracture  Fracture type: humeral shaft    Neurovascular status: Neurovascularly intact    Distal perfusion: normal    Neurological function: normal    Range of motion: normal    Manipulation performed?: No    Immobilization:  Splint and sling  Splint type:  Long arm (sugar tong)  Supplies used:  Cotton padding, elastic bandage and Ortho-Glass  Neurovascular status: Neurovascularly intact    Distal perfusion: normal    Neurological function: normal    Range of motion: normal    Patient tolerance:  Patient tolerated the procedure well with no immediate complications             ED Course  ED Course as of 12/27/22 0546   Mon Dec 26, 2022   2327 TT sent to Ortho   Tue Dec 27, 2022   0015 Per Dr Sampson Cranker, Ortho, splinting with sling and outpatient follow up appropriate          SBIRT 20yo+    Flowsheet Row Most Recent Value   SBIRT (25 yo +)    In order to provide better care to our patients, we are screening all of our patients for alcohol and drug use  Would it be okay to ask you these screening questions? No Filed at: 12/26/2022 9195        [facility]  Number of Diagnoses or Management Options  Spiral fracture of shaft of humerus: new and requires workup  Diagnosis management comments: The patient is a 71 YOF presents for right arm pain and deformity 2/2 fall just prior to arrival  Denies LOC, head strike, neck pain, other injury  On exam, patient is in no acute distress  VSS  Distal pulses 2+ in all extremities  Deformity, TTP to right upper arm  RUE neurovascularly intact  Will obtain XRs RUE  Spiral fracture of humerus noted  Pt remains in no acute distress  RUE neurovascularly intact  Case discussed with ortho; splinted as above  Placed in sling  Will d/c with prompt Ortho follow up  At the time of discharge, the patient is in no acute distress  I discussed with the patient and family the diagnosis, treatment plan, follow-up, return precautions, and discharge instructions; they were given the opportunity to ask questions and verbalized understanding         Amount and/or Complexity of Data Reviewed  Clinical lab tests: ordered and reviewed  Tests in the radiology section of CPT®: ordered and reviewed  Tests in the medicine section of CPT®: ordered and reviewed  Decide to obtain previous medical records or to obtain history from someone other than the patient: yes  Review and summarize past medical records: yes  Discuss the patient with other providers: yes (ED attending, Orthopedist )  Independent visualization of images, tracings, or specimens: yes    Risk of Complications, Morbidity, and/or Mortality  Presenting problems: moderate  Management options: low        Disposition  Final diagnoses:   Spiral fracture of shaft of humerus     Time reflects when diagnosis was documented in both MDM as applicable and the Disposition within this note     Time User Action Codes Description Comment    12/27/2022  1:02 AM Lui Odilia Luciano Add [[id number]] Spiral fracture of shaft of humerus       ED Disposition     ED Disposition   Discharge    Condition   Stable    Date/Time   Tue Dec 27, 2022  1:02 AM    Comment   Marcos Barahona discharge to home/self care                 Follow-up Information     Follow up With Specialties Details Why Contact Info Additional Information    [address] [facility]   [address]  [place] [address] [facility] [id number] [phone] [address] [facility], [address], [address] [facility], [place], [place], [id number] [phone]          Discharge Medication List as of 12/27/2022  1:07 AM      START taking these medications    Details   acetaminophen (TYLENOL) 500 mg tablet Take 1 tablet (500 mg total) by mouth every 6 (six) hours as needed for moderate pain or mild pain, Starting Tue 12/27/2022, Normal      oxyCODONE (Roxicodone) 5 immediate release tablet Take 1 tablet (5 mg total) by mouth every 6 (six) hours as needed for severe pain for up to 10 days Max Daily Amount: 20 mg, Starting Tue 12/27/2022, Until Fri 1/6/2023 at 2359, Normal         CONTINUE these medications which have NOT CHANGED    Details   Alcohol Swabs (Alcohol Pads) 70 % PADS USE AS DIRECTED THREE TIMES A DAY, Normal      b complex vitamins capsule Take 1 capsule by mouth daily, Starting Fri 8/5/2022, Normal      bisoprolol-hydrochlorothiazide (ZIAC) 5-6 25 MG per tablet Take 1 tablet by mouth every 24 hours, Starting Fri 8/5/2022, Normal      !! calcium carbonate-vitamin D (OSCAL-D) 500 mg-200 units per tablet TAKE ONE TABLET BY MOUTH EVERY 12 HOURS, Normal      !! calcium carbonate-vitamin D (OSCAL-D) 500 mg-200 units per tablet Take 1 tablet by mouth 2 (two) times a day with meals, Starting Wed 11/17/2021, Normal      calcium-vitamin D (OSCAL) 250-125 MG-UNIT per tablet Take 1 tablet by mouth daily, Starting Tue 9/3/2019, Normal      Cholecalciferol (Vitamin D3) 50 MCG (2000 UT) capsule Take 1 capsule (2,000 Units total) by mouth daily, Starting Fri 8/5/2022, Normal      FREESTYLE LITE test strip USE TO TEST THE BLOOD SUGAR TWICE A DAY, Normal      Vascepa 1 g CAPS TAKE TWO CAPSULES BY MOUTH TWICE A DAY, Normal      benzonatate (TESSALON PERLES) 100 mg capsule Take 1 capsule (100 mg total) by mouth 3 (three) times a day as needed for cough, Starting Fri 9/17/2021, Normal      clotrimazole-betamethasone (LOTRISONE) 1-0 05 % cream APPLY TOPICALLY TO AFFECTED AREA TWICE A DAY, Normal      cyclobenzaprine (FLEXERIL) 5 mg tablet Take 1 tablet (5 mg total) by mouth 3 (three) times a day as needed for muscle spasms for up to 30 doses, Starting Thu 9/15/2022, Normal      Easy Comfort Lancets MISC USE TO TEST THE BLOOD SUGAR TWICE A DAY, Normal      fluticasone (FLONASE) 50 mcg/act nasal spray 1 spray into each nostril daily, Starting Tue 4/7/2020, Normal      loratadine (CLARITIN) 10 mg tablet Take 1 tablet (10 mg total) by mouth daily, Starting Fri 10/29/2021, Normal      magnesium oxide (MAG-OX) 400 mg tablet Take 1 tablet (400 mg total) by mouth daily, Starting Mon 1/13/2020, Normal      meloxicam (MOBIC) 15 mg tablet Take 1 tablet (15 mg total) by mouth daily, Starting Thu 2/4/2021, Normal      methocarbamol (ROBAXIN) 500 mg tablet Take 1 tablet (500 mg total) by mouth 3 (three) times a day, Starting Mon 9/19/2022, Normal      methylPREDNISolone 4 MG tablet therapy pack Use as directed on package, Normal      naproxen (NAPROSYN) 500 mg tablet Take 1 tablet (500 mg total) by mouth 2 (two) times a day with meals, Starting Thu 9/15/2022, Normal      triamcinolone (KENALOG) 0 1 % lotion APPLY TOPICALLY TO AFFECTED AREA THREE TIMES A DAY, Normal      Vitamin D, Ergocalciferol, 50 MCG (2000 UT) CAPS Take 1 capsule by mouth daily, Starting Wed 7/13/2022, Normal       !! - Potential duplicate medications found  Please discuss with provider                PDMP Review     None          ED Provider  Electronically Signed by           Ollen Phoenix, PA-C  12/27/22 9381

## 2022-12-28 ENCOUNTER — OFFICE VISIT (OUTPATIENT)
Dept: OBGYN CLINIC | Facility: CLINIC | Age: 72
End: 2022-12-28

## 2022-12-28 ENCOUNTER — APPOINTMENT (OUTPATIENT)
Dept: RADIOLOGY | Facility: AMBULARY SURGERY CENTER | Age: 72
End: 2022-12-28
Attending: STUDENT IN AN ORGANIZED HEALTH CARE EDUCATION/TRAINING PROGRAM

## 2022-12-28 ENCOUNTER — TELEPHONE (OUTPATIENT)
Dept: OBGYN CLINIC | Facility: HOSPITAL | Age: 72
End: 2022-12-28

## 2022-12-28 VITALS
SYSTOLIC BLOOD PRESSURE: 132 MMHG | WEIGHT: 153 LBS | BODY MASS INDEX: 30.84 KG/M2 | DIASTOLIC BLOOD PRESSURE: 81 MMHG | HEIGHT: 59 IN | HEART RATE: 73 BPM

## 2022-12-28 DIAGNOSIS — S42.344A CLOSED NONDISPLACED SPIRAL FRACTURE OF SHAFT OF RIGHT HUMERUS, INITIAL ENCOUNTER: Primary | ICD-10-CM

## 2022-12-28 DIAGNOSIS — S42.344A CLOSED NONDISPLACED SPIRAL FRACTURE OF SHAFT OF RIGHT HUMERUS, INITIAL ENCOUNTER: ICD-10-CM

## 2022-12-28 DIAGNOSIS — S42.343A: ICD-10-CM

## 2022-12-28 NOTE — PROGRESS NOTES
Ortho Sports Medicine Shoulder New Patient Visit     Assesment:   71 y o  female left shoulder humeral shaft fracture    Plan:  The patient's diagnosis and treatment were discussed at length today  We discussed no treatment, non-operative treatment, and operative treatment  Explained to the patient that radiographs today demonstrate translation of the fracture as well as mild apex lateral deformity in the Ramos brace, which may be secondary to deltoid muscle spasm during splint removal/Ramos placement  I explained that this hopefully will improve over the next several days as her arm hangs to gravity  We placed her in a cuff and collar in addition to Ramos brace and will have her follow-up in 1 week with Dr Serina Vega for repeat radiographs  At that time they can discuss additional treatment options including nonoperative intervention versus surgical intervention  Conservative treatment:    Ice to shoulder 1-2 times daily, for 20 minutes at a time  Ramos brace  Tylenol for pain, ibuprofen for pain      Imaging: All imaging from today was reviewed by myself and explained to the patient  Injection:    No Injection planned at this time  Surgery:     Discussed possibility of surgical intervention of the patient due to fracture translation  We will follow-up in 1 week with repeat x-rays after Ramos brace and gravity have allowed for hopeful additional fracture reduction  There will follow-up with Dr Herve Huff for surgical planning  Follow up:    Return in about 1 week (around 1/4/2023) for Re-xray, recheck with Kt  Chief Complaint   Patient presents with   • Right Upper Arm - Pain       History of Present Illness: The patient is a 71 y o , right hand dominant female who presents after fall on 12/26/2022 resulting in a right humeral shaft fracture  She presents today after his presenting to the emergency room she was placed in a coaptation splint    She denies any numbness or tingling the fingers  Denies any additional injuries other than some scratching and bruising to her face  She presents with her son who is acting as primary   Pain is controlled at this point with over-the-counter medications  Shoulder Surgical History:  None    Past Medical, Social and Family History:  Past Medical History:   Diagnosis Date   • No known health problems     NO RELEVANT PAST MEDICAL HX     No past surgical history on file    Allergies   Allergen Reactions   • No Known Allergies      Current Outpatient Medications on File Prior to Visit   Medication Sig Dispense Refill   • acetaminophen (TYLENOL) 500 mg tablet Take 1 tablet (500 mg total) by mouth every 6 (six) hours as needed for moderate pain or mild pain 30 tablet 0   • Alcohol Swabs (Alcohol Pads) 70 % PADS USE AS DIRECTED THREE TIMES A  each 10   • b complex vitamins capsule Take 1 capsule by mouth daily 90 capsule 3   • benzonatate (TESSALON PERLES) 100 mg capsule Take 1 capsule (100 mg total) by mouth 3 (three) times a day as needed for cough 20 capsule 0   • bisoprolol-hydrochlorothiazide (ZIAC) 5-6 25 MG per tablet Take 1 tablet by mouth every 24 hours 90 tablet 3   • calcium carbonate-vitamin D (OSCAL-D) 500 mg-200 units per tablet TAKE ONE TABLET BY MOUTH EVERY 12 HOURS 180 tablet 2   • calcium carbonate-vitamin D (OSCAL-D) 500 mg-200 units per tablet Take 1 tablet by mouth 2 (two) times a day with meals 180 tablet 3   • calcium-vitamin D (OSCAL) 250-125 MG-UNIT per tablet Take 1 tablet by mouth daily 60 tablet 3   • Cholecalciferol (Vitamin D3) 50 MCG (2000 UT) capsule Take 1 capsule (2,000 Units total) by mouth daily 90 capsule 10   • clotrimazole-betamethasone (LOTRISONE) 1-0 05 % cream APPLY TOPICALLY TO AFFECTED AREA TWICE A DAY 60 g 0   • cyclobenzaprine (FLEXERIL) 5 mg tablet Take 1 tablet (5 mg total) by mouth 3 (three) times a day as needed for muscle spasms for up to 30 doses 30 tablet 0   • Easy Comfort Lancets MISC USE TO TEST THE BLOOD SUGAR TWICE A  each 9   • fluticasone (FLONASE) 50 mcg/act nasal spray 1 spray into each nostril daily 1 Bottle 6   • FREESTYLE LITE test strip USE TO TEST THE BLOOD SUGAR TWICE A  strip 11   • loratadine (CLARITIN) 10 mg tablet Take 1 tablet (10 mg total) by mouth daily 90 tablet 3   • magnesium oxide (MAG-OX) 400 mg tablet Take 1 tablet (400 mg total) by mouth daily 90 tablet 3   • meloxicam (MOBIC) 15 mg tablet Take 1 tablet (15 mg total) by mouth daily 90 tablet 3   • methocarbamol (ROBAXIN) 500 mg tablet Take 1 tablet (500 mg total) by mouth 3 (three) times a day 30 tablet 0   • methylPREDNISolone 4 MG tablet therapy pack Use as directed on package 21 each 0   • naproxen (NAPROSYN) 500 mg tablet Take 1 tablet (500 mg total) by mouth 2 (two) times a day with meals 20 tablet 0   • oxyCODONE (Roxicodone) 5 immediate release tablet Take 1 tablet (5 mg total) by mouth every 6 (six) hours as needed for severe pain for up to 10 days Max Daily Amount: 20 mg 12 tablet 0   • triamcinolone (KENALOG) 0 1 % lotion APPLY TOPICALLY TO AFFECTED AREA THREE TIMES A DAY 60 mL 4   • Vascepa 1 g CAPS TAKE TWO CAPSULES BY MOUTH TWICE A  capsule 10   • Vitamin D, Ergocalciferol, 50 MCG (2000 UT) CAPS Take 1 capsule by mouth daily 90 capsule 3     No current facility-administered medications on file prior to visit       Social History     Socioeconomic History   • Marital status: /Civil Union     Spouse name: Not on file   • Number of children: Not on file   • Years of education: Not on file   • Highest education level: Not on file   Occupational History   • Not on file   Tobacco Use   • Smoking status: Some Days     Types: Cigarettes   • Smokeless tobacco: Current   • Tobacco comments:     TOBACCO USE   Substance and Sexual Activity   • Alcohol use: Not on file   • Drug use: Not on file   • Sexual activity: Not on file   Other Topics Concern   • Not on file   Social History Narrative   • Not on file     Social Determinants of Health     Financial Resource Strain: Medium Risk   • Difficulty of Paying Living Expenses: Somewhat hard   Food Insecurity: Not on file   Transportation Needs: No Transportation Needs   • Lack of Transportation (Medical): No   • Lack of Transportation (Non-Medical): No   Physical Activity: Not on file   Stress: Not on file   Social Connections: Not on file   Intimate Partner Violence: Not on file   Housing Stability: Not on file         I have reviewed the past medical, surgical, social and family history, medications and allergies as documented in the EMR  Review of systems: ROS is negative other than that noted in the HPI  Constitutional: Negative for fatigue and fever  HENT: Negative for sore throat  Respiratory: Negative for shortness of breath  Cardiovascular: Negative for chest pain  Gastrointestinal: Negative for abdominal pain  Endocrine: Negative for cold intolerance and heat intolerance  Genitourinary: Negative for flank pain  Musculoskeletal: Negative for back pain  Skin: Negative for rash  Allergic/Immunologic: Negative for immunocompromised state  Neurological: Negative for dizziness  Psychiatric/Behavioral: Negative for agitation  Physical Exam:    Blood pressure 132/81, pulse 73, height 4' 11" (1 499 m), weight 69 4 kg (153 lb)      General/Constitutional: NAD, well developed, well nourished  HENT: Normocephalic, atraumatic  CV: Intact distal pulses, regular rate  Resp: No respiratory distress or labored breathing  Lymphatic: No lymphadenopathy palpated  Neuro: Alert and Oriented x 3, no focal deficits  Psych: Normal mood, normal affect, normal judgement, normal behavior  Skin: Warm, dry, no rashes, no erythema      Shoulder focused exam:     Visual section demonstrates obvious deformity at the midshaft humerus  Visible ecchymosis and edema throughout the arm  No formal range of motion was performed  No open wounds appreciated  No tenderness palpation proximally at the shoulder elbow  No tenderness palpation of the forearm wrist or hand  Motor is intact to radial nerve, axillary nerve, median, AIN, PIN, ulnar  Sensation intact light touch  Cap refills brisk  Compartment soft depressible  Radial pulses 2+  Hand is warm and well-perfused  UE NV Exam: +2 Radial pulses bilaterally  Sensation intact to light touch C5-T1 bilaterally, Radial/median/ulnar nerve motor intact      Bilateral elbow, wrist, and and forearm ROM full, painless with passive ROM, no ttp or crepitance throughout extremities below shoulder joint    Cervical ROM is full without pain, numbness or tingling      Shoulder Imaging    X-rays of the right shoulder were reviewed, which demonstrate a displaced midshaft spiral humerus shaft fracture with nondisplaced extension proximally into the humeral neck  There is translation between the segments without obvious angular deformity  Repeat x-rays today obtained after placement of coaptation splint demonstrate mild apex lateral deformity secondary to deltoid muscle spasm and abduction of the proximal fragment  There is additional translation in the medial to lateral direction on the AP radiograph  The lateral radiograph demonstrates appropriate alignment     I have reviewed the radiology report and do not currently have a radiology reading from [facility], but will check the result once the reading is performed      Scribe Attestation    I,:   am acting as a scribe while in the presence of the attending physician :       I,:   personally performed the services described in this documentation    as scribed in my presence :

## 2022-12-28 NOTE — TELEPHONE ENCOUNTER
Caller: Patient's son    Doctor: Yusuf Lopez    Reason for call: Son calling to see if they can stop at [facility] office tomorrow to have the brace trimmed because it is bothering her      Call back#: [phone]

## 2022-12-29 ENCOUNTER — TELEPHONE (OUTPATIENT)
Dept: OBGYN CLINIC | Facility: CLINIC | Age: 72
End: 2022-12-29

## 2022-12-29 NOTE — TELEPHONE ENCOUNTER
Caller: Marcos    Doctor: Dr Em Jack    Reason for call: Patient left message on voicemail regarding below message      Call back#: [phone]

## 2022-12-30 NOTE — TELEPHONE ENCOUNTER
Caller: Dilip heredia    Doctor: Caridad Henriquez    Reason for call: Patients brace is still causing irritation  Could they come to [facility] office to have adjusted?     Call back#: [phone]

## 2023-01-03 ENCOUNTER — APPOINTMENT (OUTPATIENT)
Dept: RADIOLOGY | Facility: AMBULARY SURGERY CENTER | Age: 73
End: 2023-01-03
Attending: STUDENT IN AN ORGANIZED HEALTH CARE EDUCATION/TRAINING PROGRAM

## 2023-01-03 ENCOUNTER — OFFICE VISIT (OUTPATIENT)
Dept: OBGYN CLINIC | Facility: CLINIC | Age: 73
End: 2023-01-03

## 2023-01-03 VITALS
DIASTOLIC BLOOD PRESSURE: 67 MMHG | HEIGHT: 59 IN | HEART RATE: 74 BPM | BODY MASS INDEX: 30.84 KG/M2 | WEIGHT: 153 LBS | SYSTOLIC BLOOD PRESSURE: 109 MMHG

## 2023-01-03 DIAGNOSIS — S42.344A CLOSED NONDISPLACED SPIRAL FRACTURE OF SHAFT OF RIGHT HUMERUS, INITIAL ENCOUNTER: Primary | ICD-10-CM

## 2023-01-03 DIAGNOSIS — S42.344A CLOSED NONDISPLACED SPIRAL FRACTURE OF SHAFT OF RIGHT HUMERUS, INITIAL ENCOUNTER: ICD-10-CM

## 2023-01-03 NOTE — PROGRESS NOTES
Orthopaedics Office Visit -new patient Visit    ASSESSMENT/PLAN:    Assessment:   #1 right spiral midshaft humerus fracture in appropriate alignment in a Ramos brace    Plan:   #1 patient has a midshaft right humeral shaft fracture  Patient is 71-year-old right-hand-dominant female  Discussion of operative versus nonoperative treatment measures were discussed  Discussed that nonoperative management would consist of fracture brace wear for 6 to 8 weeks and physical therapy to improve range of motion  We discussed that if the fracture is still grossly mobile at the 6-week point that we would likely need to require transition into operative fixation  Patient was also given the option for surgical fixation with open reduction internal fixation of the right humeral shaft  At this time patient would like to continue with nonoperative management  #2 patient will continue to be nonweightbearing to the right upper extremity  A prescription for physical therapy will be provided to the patient for range of motion exercises to be started in 1 week's time for range of motion exercises of the elbow and pendulum exercises  3   Patient be nonweightbearing to the right upper extremity  4   He was Ramos brace  Patient was wrapped in an Ace wrap and extra padding to try to prevent the blistering that is occurring on her right upper extremity  #5 discussed with patient that at any time they can decide to transition into operative fixation and that they should reach out to the office if they become noncompliant with the brace or if skin complications arise from the brace wear  6   Plan to follow-up with the patient in 4 weeks time for repeat x-ray evaluation and hopefully removal of the Ramos brace  7   Discussed with the patient that she can 4-5 times a day for 15 minutes loosen the brace to allow for blood flow through the right upper extremity for edema control      To Do Next Visit:  Repeat x-ray evaluation right upper extremity humerus    _____________________________________________________  CHIEF COMPLAINT:  Chief Complaint   Patient presents with   • Right Shoulder - Follow-up, Fracture     DOI: 12/26/2022 - fall resulting in a right humeral shaft fracture         SUBJECTIVE:  Jennifer Lara is a 68 y o  female who presents status post a fall over an elevated step which resulted in a right midshaft humerus fracture this  The date of injury was 12/26/2022  Patient had no previous pain in the right upper extremity  No shoulder pain and stiffness  Patient had no elbow pain after the fall  Patient was placed into a Ramos brace which with padded underneath  Patient has been compliant with brace wear however has developed swelling and edema in the right upper extremity in the forearm with some blistering  Patient was seen by family medicine doctor who prescribed her cream for the blistering  Patient denies any shortness of breath, chest pain, numbness, paresthesias    No previous injury to the right upper extremity    PAST MEDICAL HISTORY:  Past Medical History:   Diagnosis Date   • No known health problems     NO RELEVANT PAST MEDICAL HX       PAST SURGICAL HISTORY:  Past Surgical History:   Procedure Laterality Date   • CHOLECYSTECTOMY     • HERNIA REPAIR         FAMILY HISTORY:  Family History   Problem Relation Age of Onset   • Heart disease Mother         CARDIOVASCULAR DISEASE: COD   • Lung disease Father         COD   • Heart attack Brother         MYOCARDIAL INFARCTION: COD   • Heart disease Brother         CARDIOVASCULAR DISEASE   • Arrhythmia Brother         PACEMAKER   • Arrhythmia Brother         PACEMAKER   • Aortic aneurysm Sister         ABDOMINAL: COD   • Aortic aneurysm Sister         ABDOMINAL   • No Known Problems Maternal Grandmother    • No Known Problems Maternal Grandfather    • No Known Problems Paternal Grandmother    • No Known Problems Paternal Grandfather        SOCIAL HISTORY:  Social History     Tobacco Use   • Smoking status: Former     Types: Cigarettes   • Smokeless tobacco: Current   • Tobacco comments:     TOBACCO USE   Substance Use Topics   • Alcohol use: Not Currently   • Drug use: Not Currently       MEDICATIONS:    Current Outpatient Medications:   •  acetaminophen (TYLENOL) 500 mg tablet, Take 1 tablet (500 mg total) by mouth every 6 (six) hours as needed for moderate pain or mild pain, Disp: 30 tablet, Rfl: 0  •  Alcohol Swabs (Alcohol Pads) 70 % PADS, USE AS DIRECTED THREE TIMES A DAY, Disp: 100 each, Rfl: 10  •  b complex vitamins capsule, Take 1 capsule by mouth daily, Disp: 90 capsule, Rfl: 3  •  benzonatate (TESSALON PERLES) 100 mg capsule, Take 1 capsule (100 mg total) by mouth 3 (three) times a day as needed for cough, Disp: 20 capsule, Rfl: 0  •  bisoprolol-hydrochlorothiazide (ZIAC) 5-6 25 MG per tablet, Take 1 tablet by mouth every 24 hours, Disp: 90 tablet, Rfl: 3  •  calcium carbonate-vitamin D (OSCAL-D) 500 mg-200 units per tablet, TAKE ONE TABLET BY MOUTH EVERY 12 HOURS, Disp: 180 tablet, Rfl: 2  •  calcium carbonate-vitamin D (OSCAL-D) 500 mg-200 units per tablet, Take 1 tablet by mouth 2 (two) times a day with meals, Disp: 180 tablet, Rfl: 3  •  calcium-vitamin D (OSCAL) 250-125 MG-UNIT per tablet, Take 1 tablet by mouth daily, Disp: 60 tablet, Rfl: 3  •  Cholecalciferol (Vitamin D3) 50 MCG (2000 UT) capsule, Take 1 capsule (2,000 Units total) by mouth daily, Disp: 90 capsule, Rfl: 10  •  clotrimazole-betamethasone (LOTRISONE) 1-0 05 % cream, APPLY TOPICALLY TO AFFECTED AREA TWICE A DAY, Disp: 60 g, Rfl: 0  •  cyclobenzaprine (FLEXERIL) 5 mg tablet, Take 1 tablet (5 mg total) by mouth 3 (three) times a day as needed for muscle spasms for up to 30 doses, Disp: 30 tablet, Rfl: 0  •  Easy Comfort Lancets MISC, USE TO TEST THE BLOOD SUGAR TWICE A DAY, Disp: 100 each, Rfl: 9  •  fluticasone (FLONASE) 50 mcg/act nasal spray, 1 spray into each nostril daily, Disp: 1 Bottle, Rfl: 6  •  FREESTYLE LITE test strip, USE TO TEST THE BLOOD SUGAR TWICE A DAY, Disp: 100 strip, Rfl: 11  •  loratadine (CLARITIN) 10 mg tablet, Take 1 tablet (10 mg total) by mouth daily, Disp: 90 tablet, Rfl: 3  •  magnesium oxide (MAG-OX) 400 mg tablet, Take 1 tablet (400 mg total) by mouth daily, Disp: 90 tablet, Rfl: 3  •  meloxicam (MOBIC) 15 mg tablet, Take 1 tablet (15 mg total) by mouth daily, Disp: 90 tablet, Rfl: 3  •  methocarbamol (ROBAXIN) 500 mg tablet, Take 1 tablet (500 mg total) by mouth 3 (three) times a day, Disp: 30 tablet, Rfl: 0  •  naproxen (NAPROSYN) 500 mg tablet, Take 1 tablet (500 mg total) by mouth 2 (two) times a day with meals, Disp: 20 tablet, Rfl: 0  •  oxyCODONE (Roxicodone) 5 immediate release tablet, Take 1 tablet (5 mg total) by mouth every 6 (six) hours as needed for severe pain for up to 10 days Max Daily Amount: 20 mg, Disp: 12 tablet, Rfl: 0  •  triamcinolone (KENALOG) 0 1 % lotion, APPLY TOPICALLY TO AFFECTED AREA THREE TIMES A DAY, Disp: 60 mL, Rfl: 3  •  Vascepa 1 g CAPS, TAKE TWO CAPSULES BY MOUTH TWICE A DAY, Disp: 360 capsule, Rfl: 10  •  Vitamin D, Ergocalciferol, 50 MCG (2000 UT) CAPS, Take 1 capsule by mouth daily, Disp: 90 capsule, Rfl: 3  •  methylPREDNISolone 4 MG tablet therapy pack, Use as directed on package (Patient not taking: Reported on 1/3/2023), Disp: 21 each, Rfl: 0    ALLERGIES:  Allergies   Allergen Reactions   • No Known Allergies        REVIEW OF SYSTEMS:  MSK: Right upper extremity pain  Neuro: No numbness or paresthesias  Pertinent items are otherwise noted in HPI  A comprehensive review of systems was otherwise negative      LABS:  HgA1c:   Lab Results   Component Value Date    HGBA1C 5 8 (H) 09/15/2022     BMP:   Lab Results   Component Value Date    CALCIUM 8 8 12/26/2022    K 3 5 12/26/2022    CO2 28 12/26/2022     12/26/2022    BUN 25 12/26/2022    CREATININE 0 82 12/26/2022     CBC: No components found for: CBC    _____________________________________________________  PHYSICAL EXAMINATION:  Vital signs: /67 (BP Location: Left arm, Patient Position: Sitting)   Pulse 74   Ht 4' 11" (1 499 m)   Wt 69 4 kg (153 lb)   LMP  (LMP Unknown)   BMI 30 90 kg/m²   General: No acute distress, awake and alert  Psychiatric: Mood and affect appear appropriate  HEENT: Trachea Midline, No torticollis, no apparent facial trauma  Cardiovascular: No audible murmurs; Extremities appear perfused  Pulmonary: No audible wheezing or stridor  Skin: No open lesions; see further details (if any) below    MUSCULOSKELETAL EXAMINATION:  Extremities: Right upper extremity was exposed inspected  The Ramos brace was removed  Padding was removed to allow for visualization of the right upper extremity skin  Patient has a moderate to severe amount of swelling in the right upper extremity  There is some blistering over the antecubital fossa from brace wear  Patient's skin is intact however overlying the fracture site  No gross deformity  Patient's sensation is intact to light touch in the axillary, radial, ulnar, median nerve distributions  AIN, PIN, ulnar nerves intact  +2 radial pulses distally  Compartments soft compressible       _____________________________________________________  STUDIES REVIEWED:  I personally reviewed the images and interpretation is as follows:  X-rays of the right humerus demonstrated a midshaft spiral humeral shaft fracture with less than 20 degrees of varus/valgus angulation and less than 20 degrees of anterior and posterior angulation  No fractures at the proximal humerus or the elbow      PROCEDURES PERFORMED:  Randy Gallego,

## 2023-01-09 ENCOUNTER — TELEPHONE (OUTPATIENT)
Dept: OBGYN CLINIC | Facility: HOSPITAL | Age: 73
End: 2023-01-09

## 2023-01-09 DIAGNOSIS — S42.344A CLOSED NONDISPLACED SPIRAL FRACTURE OF SHAFT OF RIGHT HUMERUS, INITIAL ENCOUNTER: Primary | ICD-10-CM

## 2023-01-10 NOTE — TELEPHONE ENCOUNTER
Please contact the patient find out more information as to why she cannot go to outpatient PT  In order for home PT to be approved, there needs to be medically justifiable reasons that they cannot physically leave the house on their own  For an upper extremity injury alone we may not be able to justify this  If the patient is able to ambulate on their own without assistance and able to get into a vehicle on their own to be driven to a PT office, the home PT may not be approved

## 2023-01-11 NOTE — TELEPHONE ENCOUNTER
Caller: Nephew     Doctor: Kt    Reason for call: Returning triage nurse's call about previous message   Transferred    Call back# n/a

## 2023-01-11 NOTE — TELEPHONE ENCOUNTER
Spoke with nephew and he states she is very unsteady on her feet due to her knee issues  She cannot tolerate the cold as she cannot fit a heavy coat on due to fx  They are hoping that we can at least try for referral for home PT    Thanks

## 2023-01-11 NOTE — TELEPHONE ENCOUNTER
If they tell us that the patient requires assistance to safely ambulate or to leave the home and that she either fatigues easily in short distances or has limited endurance we can attempt to get the home PT approved based on that  We do not have any diagnosis of knee issues in her chart in our system, so we at least need some type of documentation to attempt to get the home health order approved

## 2023-01-11 NOTE — TELEPHONE ENCOUNTER
Home PT order was placed   Will wait to hear from our [facility] to see if they can accommodate and if home health will be covered

## 2023-01-12 ENCOUNTER — HOME CARE VISIT (OUTPATIENT)
Dept: HOME HEALTH SERVICES | Facility: HOME HEALTHCARE | Age: 73
End: 2023-01-12

## 2023-01-12 ENCOUNTER — HOME HEALTH ADMISSION (OUTPATIENT)
Dept: HOME HEALTH SERVICES | Facility: HOME HEALTHCARE | Age: 73
End: 2023-01-12

## 2023-01-12 NOTE — TELEPHONE ENCOUNTER
Called and spoke with nephewJordana and informed that order for home PT and VNA has been placed  He stated the the [facility]NA had called his other aunt by mistake today and she does not speak much English so there was some confusion  [facility] contact number given to giovanna for f/u

## 2023-01-13 ENCOUNTER — HOME CARE VISIT (OUTPATIENT)
Dept: HOME HEALTH SERVICES | Facility: HOME HEALTHCARE | Age: 73
End: 2023-01-13

## 2023-01-13 VITALS
SYSTOLIC BLOOD PRESSURE: 136 MMHG | WEIGHT: 155 LBS | DIASTOLIC BLOOD PRESSURE: 78 MMHG | HEART RATE: 74 BPM | BODY MASS INDEX: 28.52 KG/M2 | HEIGHT: 62 IN | OXYGEN SATURATION: 97 %

## 2023-01-13 NOTE — Clinical Note
[facility] has admitted your patient to [address] [place] [address] service with the following disciplines:      PT and OT  Pt is declining home OT at this time  Recommend and requesting a MSW order per pts family request for information pertaining to transportation resources    Response needed, please respond via EPIC communication and documentation pertaining to pt question of taking 81 mg aspirin? ??    Primary focus of home health care MUSCULOSKELETAL    Patient stated goals of care TO BE ABLE TO USE THE RIGHT ARM BETTER, FOR RIGHT ARM TO HEAL, TO BE ABLE TO GET OUT OF THE HOME FOR APPOINTMENTS  Anticipated visit pattern 1WK1 AND STARTING WEEK OF 01/15/23 6SK8 AND 3BU3  See medication list - meds in home differ from AVS  pt reports ortho MD at recent visit verbally reported for pt to take 81 mg aspirin  Did not see on pts current EPIC med list  Please confirm and reach out to pt to confirm  Significant clinical findings inc pain RUE at rest  Potential barriers to goal achievement multiple steps to enter,limited mobility to household at this time with use of SPC, declning home OT at this time 2* only wants one person coming at a time and prefers PT  Other pertinent information pt speaks Urdu  Pts nephew will be available during home visits to A with home visit and translation  Thank you for allowing us to participate in the care of your patient        Satya Abdullahi DPT

## 2023-01-14 NOTE — CASE COMMUNICATION
pt currently reports not taking the following medications:  Vit D, Naproxen, Methyprednisone, methocarbamol, meloxicam, magnesium oxide, claritin, use of Freestyle Lite test strip (no longer tests BS), Easy Comfort Lancets, Flexeril, Benzonatate, calcium carbonate-vit D, Lotrisone cream

## 2023-01-16 ENCOUNTER — HOME CARE VISIT (OUTPATIENT)
Dept: HOME HEALTH SERVICES | Facility: HOME HEALTHCARE | Age: 73
End: 2023-01-16

## 2023-01-17 ENCOUNTER — HOME CARE VISIT (OUTPATIENT)
Dept: HOME HEALTH SERVICES | Facility: HOME HEALTHCARE | Age: 73
End: 2023-01-17

## 2023-01-17 VITALS — HEART RATE: 60 BPM | DIASTOLIC BLOOD PRESSURE: 62 MMHG | OXYGEN SATURATION: 99 % | SYSTOLIC BLOOD PRESSURE: 116 MMHG

## 2023-01-19 ENCOUNTER — HOME CARE VISIT (OUTPATIENT)
Dept: HOME HEALTH SERVICES | Facility: HOME HEALTHCARE | Age: 73
End: 2023-01-19

## 2023-01-20 ENCOUNTER — TELEPHONE (OUTPATIENT)
Dept: OBGYN CLINIC | Facility: HOSPITAL | Age: 73
End: 2023-01-20

## 2023-01-20 ENCOUNTER — HOME CARE VISIT (OUTPATIENT)
Dept: HOME HEALTH SERVICES | Facility: HOME HEALTHCARE | Age: 73
End: 2023-01-20

## 2023-01-20 VITALS — HEART RATE: 60 BPM | SYSTOLIC BLOOD PRESSURE: 116 MMHG | OXYGEN SATURATION: 98 % | DIASTOLIC BLOOD PRESSURE: 72 MMHG

## 2023-01-20 NOTE — TELEPHONE ENCOUNTER
CLM for [facility], to get more info after reviewing pt's chart  Is brace still intact w/padding? There was an ace wrap place around brace  According to chart, pt to wear brace 6-8 wks  Can loosen 4-5 times a day for 15 min to allow for blood flow thru RUE for edema control  Await call back

## 2023-01-20 NOTE — TELEPHONE ENCOUNTER
Stevenson Katz called back  States pt has padding and and ace wrap under brace that family feels has moved a little  Apparently, pt had blistering by elbow, that Stevenson Katz reports has scabbed over now  Stevenson Katz did not feel comfortable removing brace since this is supporting pt's fx  Please advise

## 2023-01-20 NOTE — TELEPHONE ENCOUNTER
Message will be given to [facility] rep and they will contact patient to set a date/time to come in and get re-fitted for brace

## 2023-01-20 NOTE — TELEPHONE ENCOUNTER
Caller: [facility] care     Doctor: Oleg Collins     Reason for call: When she was out with the patient yesterday the family asked for her right arm to be re wrapped  She said that the ace bandage under it became loose and shifted down her arm  Should patient come in to have it looked at or let it as it is       Call back#: [phone]

## 2023-01-20 NOTE — TELEPHONE ENCOUNTER
If the family would like the Ace wrap underneath the Ramos brace adjusted, we could offer them to either go to [facility] on [address] on 1/23/2023 or potentially stop by the office if we have one of our orthotic figures here that day so that they can take it off and reapplied properly

## 2023-01-20 NOTE — TELEPHONE ENCOUNTER
Reviewed communication sheet and Pt's nephew, Karolyn Palomares [phone] is on there and Ofelia Parra PT stated that she has been speaking with him  Are y'all able to take care of this msg from Stephanie Perry PA-C as I do not know when orthotic figures are in your office if they choose to come into office instead of going to [facility]? Thanks!

## 2023-01-23 ENCOUNTER — HOME CARE VISIT (OUTPATIENT)
Dept: HOME HEALTH SERVICES | Facility: HOME HEALTHCARE | Age: 73
End: 2023-01-23

## 2023-01-25 ENCOUNTER — HOME CARE VISIT (OUTPATIENT)
Dept: HOME HEALTH SERVICES | Facility: HOME HEALTHCARE | Age: 73
End: 2023-01-25

## 2023-01-27 ENCOUNTER — HOME CARE VISIT (OUTPATIENT)
Dept: HOME HEALTH SERVICES | Facility: HOME HEALTHCARE | Age: 73
End: 2023-01-27

## 2023-01-30 NOTE — CASE COMMUNICATION
Marcos discharged from [facility] PT services on 1/27  Marcos is independent with HEP which includs: elbow ROM, wrist ROM and pendulums as directed by Dr Herve Huff  Will benefit from referral to OP PT once cleared for additional ROM and strengthening activities

## 2023-01-31 ENCOUNTER — OFFICE VISIT (OUTPATIENT)
Dept: OBGYN CLINIC | Facility: CLINIC | Age: 73
End: 2023-01-31

## 2023-01-31 ENCOUNTER — APPOINTMENT (OUTPATIENT)
Dept: RADIOLOGY | Facility: AMBULARY SURGERY CENTER | Age: 73
End: 2023-01-31
Attending: STUDENT IN AN ORGANIZED HEALTH CARE EDUCATION/TRAINING PROGRAM

## 2023-01-31 VITALS
WEIGHT: 155 LBS | HEIGHT: 62 IN | DIASTOLIC BLOOD PRESSURE: 72 MMHG | HEART RATE: 69 BPM | SYSTOLIC BLOOD PRESSURE: 160 MMHG | BODY MASS INDEX: 28.52 KG/M2

## 2023-01-31 DIAGNOSIS — S42.344A CLOSED NONDISPLACED SPIRAL FRACTURE OF SHAFT OF RIGHT HUMERUS, INITIAL ENCOUNTER: Primary | ICD-10-CM

## 2023-01-31 DIAGNOSIS — S42.344A CLOSED NONDISPLACED SPIRAL FRACTURE OF SHAFT OF RIGHT HUMERUS, INITIAL ENCOUNTER: ICD-10-CM

## 2023-01-31 NOTE — PROGRESS NOTES
Orthopaedics Office Visit -Follow up patient Visit    ASSESSMENT/PLAN:    Assessment:   #1 right spiral midshaft humerus fracture treated nonoperatively in appropriate alignment in a Ramos brace of injury 12/26/2022    Plan:     · Continue use of Ramos brace for 1 more week, at which point she can begin to discontinue use of brace gradually  · Patient can continue take anti-inflammatory medications, Tylenol as needed for pain  · Continue physical therapy  Patient is planning on switching to formal outpatient physical therapy as opposed to in-home therapy  Patient status post nonoperative management of right humeral shaft fracture  Today's date is 1/31/2023  Patient will remain in the Ramos fracture brace for 1 more week  After which she can be removed from the brace  Patient should start pendulum exercises to the right upper extremity for 1 week starting the day she comes out of the brace  She can then progress to active assisted range of motion exercises to the right upper extremity for 1 to 2 weeks  She can then progress to active range of motion, gentle strengthening, and weightbearing to the right upper extremity     · Follow up in 4 weeks, repeat x-rays at that time       To Do Next Visit:  Repeat x-ray evaluation right upper extremity humerus    _____________________________________________________  CHIEF COMPLAINT:  Chief Complaint   Patient presents with   • Right Arm - Follow-up         SUBJECTIVE:  Vanessa Cherry is a 68 y o  female who presents status post a fall over an elevated step which resulted in a right midshaft humerus fracture this  The date of injury was 12/26/2022  Patient had no previous pain in the right upper extremity  No shoulder pain and stiffness  Patient had no elbow pain after the fall  Patient was placed into a Ramos brace which with padded underneath    Patient has been compliant with brace wear however has developed swelling and edema in the right upper extremity in the forearm with some blistering  Patient was seen by family medicine doctor who prescribed her cream for the blistering  Patient denies any shortness of breath, chest pain, numbness, paresthesias  No previous injury to the right upper extremity    Interval history 1/31/2023  Patient presents for follow-up of right spiral fracture of shaft of humerus  DOI 12/26/2022  Patient presents with her  and son, her son helps with translation  He states she is doing well, she has been compliant with physical therapy and the home exercises they provided  She has been compliant with use of Ramos brace  They are concerned with the fit of the brace, state it has loosened and is not fitting correctly  Overall she notes pain throughout the upper extremity but states it is well controlled  She denies any numbness or paresthesias         PAST MEDICAL HISTORY:  Past Medical History:   Diagnosis Date   • No known health problems     NO RELEVANT PAST MEDICAL HX       PAST SURGICAL HISTORY:  Past Surgical History:   Procedure Laterality Date   • CHOLECYSTECTOMY     • HERNIA REPAIR         FAMILY HISTORY:  Family History   Problem Relation Age of Onset   • Heart disease Mother         CARDIOVASCULAR DISEASE: COD   • Lung disease Father         COD   • Heart attack Brother         MYOCARDIAL INFARCTION: COD   • Heart disease Brother         CARDIOVASCULAR DISEASE   • Arrhythmia Brother         PACEMAKER   • Arrhythmia Brother         PACEMAKER   • Aortic aneurysm Sister         ABDOMINAL: COD   • Aortic aneurysm Sister         ABDOMINAL   • No Known Problems Maternal Grandmother    • No Known Problems Maternal Grandfather    • No Known Problems Paternal Grandmother    • No Known Problems Paternal Grandfather        SOCIAL HISTORY:  Social History     Tobacco Use   • Smoking status: Former     Types: Cigarettes   • Smokeless tobacco: Current   • Tobacco comments:     TOBACCO USE   Substance Use Topics   • Alcohol use: Not Currently   • Drug use: Not Currently       MEDICATIONS:    Current Outpatient Medications:   •  acetaminophen (TYLENOL) 500 mg tablet, Take 1 tablet (500 mg total) by mouth every 6 (six) hours as needed for moderate pain or mild pain, Disp: 30 tablet, Rfl: 0  •  Alcohol Swabs (Alcohol Pads) 70 % PADS, USE AS DIRECTED THREE TIMES A DAY, Disp: 100 each, Rfl: 10  •  aspirin (ECOTRIN LOW STRENGTH) 81 mg EC tablet, Take 1 tablet (81 mg total) by mouth daily, Disp: 90 tablet, Rfl: 6  •  b complex vitamins capsule, Take 1 capsule by mouth daily, Disp: 90 capsule, Rfl: 3  •  benzonatate (TESSALON PERLES) 100 mg capsule, Take 1 capsule (100 mg total) by mouth 3 (three) times a day as needed for cough, Disp: 20 capsule, Rfl: 0  •  bisoprolol-hydrochlorothiazide (ZIAC) 5-6 25 MG per tablet, Take 1 tablet by mouth every 24 hours, Disp: 90 tablet, Rfl: 3  •  calcium carbonate-vitamin D (OSCAL-D) 500 mg-200 units per tablet, Take 1 tablet by mouth 2 (two) times a day with meals, Disp: 180 tablet, Rfl: 3  •  calcium-vitamin D (OSCAL) 250-125 MG-UNIT per tablet, Take 1 tablet by mouth daily, Disp: 60 tablet, Rfl: 3  •  clotrimazole-betamethasone (LOTRISONE) 1-0 05 % cream, APPLY TOPICALLY TO AFFECTED AREA TWICE A DAY, Disp: 60 g, Rfl: 0  •  cyclobenzaprine (FLEXERIL) 5 mg tablet, Take 1 tablet (5 mg total) by mouth 3 (three) times a day as needed for muscle spasms for up to 30 doses, Disp: 30 tablet, Rfl: 0  •  Easy Comfort Lancets MISC, USE TO TEST THE BLOOD SUGAR TWICE A DAY, Disp: 100 each, Rfl: 9  •  fluticasone (FLONASE) 50 mcg/act nasal spray, 1 spray into each nostril daily (Patient taking differently: 1 spray into each nostril daily pt using only as needed), Disp: 1 Bottle, Rfl: 6  •  FREESTYLE LITE test strip, USE TO TEST THE BLOOD SUGAR TWICE A DAY, Disp: 100 strip, Rfl: 11  •  loratadine (CLARITIN) 10 mg tablet, Take 1 tablet (10 mg total) by mouth daily (Patient taking differently: Take 10 mg by mouth daily pt using this only as needed), Disp: 90 tablet, Rfl: 3  •  magnesium oxide (MAG-OX) 400 mg tablet, Take 1 tablet (400 mg total) by mouth daily, Disp: 90 tablet, Rfl: 3  •  meloxicam (MOBIC) 15 mg tablet, Take 1 tablet (15 mg total) by mouth daily (Patient taking differently: Take 15 mg by mouth daily pt is using this only as needed), Disp: 90 tablet, Rfl: 3  •  methocarbamol (ROBAXIN) 500 mg tablet, Take 1 tablet (500 mg total) by mouth 3 (three) times a day (Patient taking differently: Take 500 mg by mouth 3 (three) times a day pt uses this only as needed), Disp: 30 tablet, Rfl: 0  •  triamcinolone (KENALOG) 0 1 % lotion, APPLY TOPICALLY TO AFFECTED AREA THREE TIMES A DAY, Disp: 60 mL, Rfl: 3  •  Vascepa 1 g CAPS, TAKE TWO CAPSULES BY MOUTH TWICE A DAY, Disp: 360 capsule, Rfl: 10  •  Vitamin D, Ergocalciferol, 50 MCG (2000 UT) CAPS, Take 1 capsule by mouth daily, Disp: 90 capsule, Rfl: 3  •  calcium carbonate-vitamin D (OSCAL-D) 500 mg-200 units per tablet, TAKE ONE TABLET BY MOUTH EVERY 12 HOURS (Patient not taking: No sig reported), Disp: 180 tablet, Rfl: 2  •  Cholecalciferol (Vitamin D3) 50 MCG (2000 UT) capsule, Take 1 capsule (2,000 Units total) by mouth daily (Patient not taking: Reported on 1/25/2023), Disp: 90 capsule, Rfl: 10  •  methylPREDNISolone 4 MG tablet therapy pack, Use as directed on package (Patient not taking: Reported on 1/31/2023), Disp: 21 each, Rfl: 0  •  naproxen (NAPROSYN) 500 mg tablet, Take 1 tablet (500 mg total) by mouth 2 (two) times a day with meals (Patient not taking: Reported on 1/25/2023), Disp: 20 tablet, Rfl: 0    ALLERGIES:  Allergies   Allergen Reactions   • No Known Allergies        REVIEW OF SYSTEMS:  MSK: Right upper extremity pain  Neuro: No numbness or paresthesias  Pertinent items are otherwise noted in HPI  A comprehensive review of systems was otherwise negative      LABS:  HgA1c:   Lab Results   Component Value Date    HGBA1C 5 8 (H) 09/15/2022     BMP:   Lab Results   Component Value Date    CALCIUM 8 8 12/26/2022    K 3 5 12/26/2022    CO2 28 12/26/2022     12/26/2022    BUN 25 12/26/2022    CREATININE 0 82 12/26/2022     CBC: No components found for: CBC    _____________________________________________________  PHYSICAL EXAMINATION:  Vital signs: /72 (BP Location: Left arm, Patient Position: Sitting, Cuff Size: Standard)   Pulse 69   Ht 5' 2" (1 575 m)   Wt 70 3 kg (155 lb)   LMP  (LMP Unknown)   BMI 28 35 kg/m²   General: No acute distress, awake and alert  Psychiatric: Mood and affect appear appropriate  HEENT: Trachea Midline, No torticollis, no apparent facial trauma  Cardiovascular: No audible murmurs; Extremities appear perfused  Pulmonary: No audible wheezing or stridor  Skin: No open lesions; see further details (if any) below    MUSCULOSKELETAL EXAMINATION:  Extremities: Right upper extremity was exposed inspected  The Ramos brace was removed  Padding was removed to allow for visualization of the right upper extremity skin  Patient swelling is much improved from previous visit  Her previous blistering over the antecubital fossa have healed  There is no current blisters over the right upper extremity  She has some elbow stiffness with a slight loss of approximately 5 degrees of extension of the elbow with stiffness anteriorly when trying to fully extend on exam likely from immobilization  She has no gross motion at the fracture site with stress but does have some discomfort  Patient's skin is intact however overlying the fracture site  No gross deformity  Patient's sensation is intact to light touch in the axillary, radial, ulnar, median nerve distributions  AIN, PIN, ulnar nerves intact  +2 radial pulses distally    Compartments soft compressible       _____________________________________________________  STUDIES REVIEWED:  I personally reviewed the images and interpretation is as follows:  X-rays of the right humerus demonstrated a midshaft spiral humeral shaft fracture with less than 20 degrees of varus/valgus angulation and less than 20 degrees of anterior and posterior angulation  No fractures at the proximal humerus or the elbow  X-rays taken 1/31/2023 of the right humerus demonstrate maintained alignment of the right spiral humeral shaft fracture  Patient has interval fracture healing with abundant callus formation both proximally and distally in the spiral humeral shaft fracture  She has some bridging callus that is able to be visualized on the medial and superior aspect of the humerus  She has an abundant amount of callus at the inferior aspect of the spiral   No change in alignment      PROCEDURES PERFORMED:  Procedures    Scribe Attestation    I,:  Simone Aaron am acting as a scribe while in the presence of the attending physician :       I,:  Brynn Jasmine DO personally performed the services described in this documentation    as scribed in my presence :

## 2023-02-10 ENCOUNTER — EVALUATION (OUTPATIENT)
Dept: PHYSICAL THERAPY | Facility: CLINIC | Age: 73
End: 2023-02-10

## 2023-02-10 DIAGNOSIS — S42.344A CLOSED NONDISPLACED SPIRAL FRACTURE OF SHAFT OF RIGHT HUMERUS, INITIAL ENCOUNTER: ICD-10-CM

## 2023-02-10 NOTE — PROGRESS NOTES
PT Evaluation     Today's date: 2/10/2023  Patient name: Denys Helton  : 1950  MRN: [id number]  Referring provider: Ely Bass DO  Dx:   Encounter Diagnosis     ICD-10-CM    1  Closed nondisplaced spiral fracture of shaft of right humerus, initial encounter  S42 344A Ambulatory Referral to Physical Therapy     Ambulatory Referral to Physical Therapy                     Assessment  Assessment details: Pt is a 68y o  year old female presenting to physical therapy for Closed nondisplaced spiral fracture of shaft of right humerus after fall on 22  She presents with the following impairments: limited R shoulder PROM, TTP along mid shaft of humerus, R UE weakness, and trophic changes from being in brace for long period affecting her function with lifting, carrying, reaching, dressing, cooking, sleeping, and cleaning/showering  Pt will benefit from skilled physical therapy to address functional limitations noted in evaluation and meet patient goals  Impairments: abnormal muscle firing, abnormal or restricted ROM, abnormal movement, activity intolerance, difficulty understanding, impaired physical strength, lacks appropriate home exercise program, pain with function, poor posture  and poor body mechanics    Symptom irritability: moderate  Goals  ST  Pt will reduce pain to 2/10  2  Pt will improve R shoulder flexion PROM to 120 degrees  LT  Pt will improve R shoulder flexion AROM to 110 degrees for improved ability to reach overhead  2  Pt will improve R shoulder flex and ABD strength to 4/5 for improved ability to lift  3  Pt will be I w HEP      Plan  Patient would benefit from: PT eval and skilled physical therapy  Planned modality interventions: biofeedback, cryotherapy, electrical stimulation/Russian stimulation, TENS, thermotherapy: hydrocollator packs and unattended electrical stimulation  Planned therapy interventions: manual therapy, joint mobilization, abdominal trunk stabilization, neuromuscular re-education, patient education, strengthening, stretching, therapeutic activities, therapeutic exercise, flexibility, functional ROM exercises, home exercise program and body mechanics training  Frequency: 2x week  Duration in weeks: 6  Treatment plan discussed with: patient and family        Subjective Evaluation    History of Present Illness  Mechanism of injury: Pt had a fall on Dec 26th and fractured R humerus  She has been wearing barahona brace since injury, but is being removed it today  She reports some difficulty sleeping, and has some numbness in her fingertips  She does not work, but her goal is to be able to cook and dress without issue  Pain  Current pain ratin          Objective     Observations     Right Shoulder  Positive for trophic changes  Tenderness     Right Shoulder  No tenderness in the acromion, bicipital groove, infraspinatus tendon, subscapularis tendon and supraspinatus tendon  Right Elbow   No tenderness in the lateral epicondyle and medial epicondyle  Right Wrist/Hand   No tenderness in the lateral epicondyle and medial epicondyle  Additional Tenderness Details  TTP along proximal mid portion of humerus  Active Range of Motion     Left Elbow   Normal active range of motion    Right Elbow   Flexion: 100 degrees with pain  Extension: 35 degrees with pain    Additional Active Range of Motion Details  Not assessed    Passive Range of Motion     Right Shoulder   Flexion: 90 degrees   Abduction: 70 degrees     Left Elbow   Flexion: 130 degrees   Extension: 0 degrees     Right Elbow   Flexion: 110 degrees with pain  Extension: 35 degrees with pain    Strength/Myotome Testing     Additional Strength Details  Not assessed             Precautions: R humerus Fx on 22, out of barahona brace on 2/10/23    (Yi speaking only)    MD script: "Patient should start pendulum exercises to the right upper extremity for 1 week starting the day she comes out of the brace (2/7/23)   She can then progress to active assisted range of motion exercises to the right upper extremity for 1 to 2 weeks  Wilmar Sers can then progress to active range of motion, gentle strengthening, and weightbearing to the right upper extremity "    Date 2/10            Visit # 1            FOTO IE             Re-eval IE              Manuals 2/10            R shoulder PROM SF                                                   Neuro Re-Ed 2/10            Scap retraction 5x10"            Gripper             Flexbar for wrist                                                                 Ther Ex 2/10            Pulleys gentle            Elbow flex/ext PROM 10x ea            Pendulums 2x30"             Table slides             SB rolling             Shoulder AAROM hold                                      Ther Activity                                       Gait Training                                       Modalities

## 2023-02-13 ENCOUNTER — OFFICE VISIT (OUTPATIENT)
Dept: PHYSICAL THERAPY | Facility: CLINIC | Age: 73
End: 2023-02-13

## 2023-02-13 DIAGNOSIS — S42.344A CLOSED NONDISPLACED SPIRAL FRACTURE OF SHAFT OF RIGHT HUMERUS, INITIAL ENCOUNTER: Primary | ICD-10-CM

## 2023-02-13 NOTE — PROGRESS NOTES
Daily Note     Today's date: 2023  Patient name: Mattie Croft  : 1950  MRN: [id number]  Referring provider: Shelby Padilla DO  Dx:   Encounter Diagnosis     ICD-10-CM    1  Closed nondisplaced spiral fracture of shaft of right humerus, initial encounter  S42 344A                      Subjective: Pt reported that she has been having muscle spasms in her arm that cause her a lot of pain and come out of nowhere  Objective: See treatment diary below      Assessment: Pt's son was present during session to help with translation  Pt was educated on proper form with table slides to ensure passive/active assisted motion with movement  Pt exhibited moderate discomfort with pulleys at beginning of session, pt tolerated them at end of session with longer pulleys utilized to ease into ROM  Pt was instructed on new stretching and PROM/AAROM activities to perform at home  Pt tolerated manual PROM of R shoulder well with mild discomfort post treatment  Plan: Continue per plan of care  Progress treatment as tolerated  Precautions: R humerus Fx on 22, out of barahona brace on 2/10/23    (Ukrainian speaking only)    MD script: "Patient should start pendulum exercises to the right upper extremity for 1 week starting the day she comes out of the brace (23)   She can then progress to active assisted range of motion exercises to the right upper extremity for 1 to 2 weeks   She can then progress to active range of motion, gentle strengthening, and weightbearing to the right upper extremity "    Date 2/10 2 13           Visit # 1 2           FOTO IE             Re-eval IE              Manuals 2/10 2/13           R shoulder PROM SF DK                                                  Neuro Re-Ed 2/10            Scap retraction 5x10" 10x10"           Gripper  2x10 red           Flexbar for wrist   10x ea red                                                  Pt Edu  DK           Ther Ex 2/10 Pulleys gentle 5'           Elbow flex/ext PROM 10x ea 2x10           Pendulums 2x30"  3x30"           Table slides  10x5"           SB rolling             Shoulder AAROM hold            Passive elbow flexion str  3x30" hang off table with 2#                        Ther Activity                                       Gait Training                                       Modalities Baker's cyst of knee, right

## 2023-02-16 ENCOUNTER — OFFICE VISIT (OUTPATIENT)
Dept: PHYSICAL THERAPY | Facility: CLINIC | Age: 73
End: 2023-02-16

## 2023-02-16 DIAGNOSIS — S42.344A CLOSED NONDISPLACED SPIRAL FRACTURE OF SHAFT OF RIGHT HUMERUS, INITIAL ENCOUNTER: Primary | ICD-10-CM

## 2023-02-16 NOTE — PROGRESS NOTES
Daily Note     Today's date: 2023  Patient name: Diego Nelson  : 1950  MRN: [id number]  Referring provider: Kasey Godwin DO  Dx:   Encounter Diagnosis     ICD-10-CM    1  Closed nondisplaced spiral fracture of shaft of right humerus, initial encounter  S42 344A           Start Time: 1045  Stop Time: 1130  Total time in clinic (min): 45 minutes    Subjective: Pt stated that she has been performing her HEP at home and that her R arm has felt less stiff but still continues to have muscle spasms occasionally throughout the day with light activity  Objective: See treatment diary below      Assessment: Pt tolerated pulleys as warm up well today with observable increase in PROM/AAROM of shoulder flexion  Pt tolerated manual R shoulder and elbow PROM well with mild discomfort with elbow extension manual stretching  Pt performed PROM/AAROM activities well today with mild fatigue post treatment  Pt would benefit from continued skilled PT to improve ROM of R shoulder and elbow as well as return to PLOF  Plan: Continue per plan of care  Progress treatment as tolerated  Plan to implement light AAROM activities starting nv  Precautions: R humerus Fx on 22, out of bar[place]ona brace on 2/10/23    ([place] speaking only)    MD script: "Patient should start pendulum exercises to the right upper extremity for 1 week starting the day she comes out of the brace (23)   She can then progress to active assisted range of motion exercises to the right upper extremity for 1 to 2 weeks   She can then progress to active range of motion, gentle strengthening, and weightbearing to the right upper extremity "    Date 2/10 2 13 2/16          Visit # 1 2 3          FOTO IE             Re-eval IE              Manuals 2/10 2/13 2/16          R shoulder PROM SF DK DK          R elbow PROM   DK                                    Neuro Re-Ed 2/10  2/16          Scap retraction 5x10" 10x10" 20x5"          Gripper 2x10 red 20x green          Flexbar for wrist   10x ea red 10x ea red                                                 Pt Edu  DK           Ther Ex 2/10  2/16          Pulleys gentle 5' 5'          Elbow flex/ext PROM 10x ea 2x10 20x          Pendulums 2x30"  3x30" 3x30"          Table slides  10x5" 10x5"          SB rolling   20x5"          Shoulder AAROM hold            Passive elbow flexion str  3x30" hang off table with 2# 3x1' hang off table 2#                       Ther Activity                                       Gait Training                                       Modalities

## 2023-02-20 ENCOUNTER — OFFICE VISIT (OUTPATIENT)
Dept: PHYSICAL THERAPY | Facility: CLINIC | Age: 73
End: 2023-02-20

## 2023-02-20 DIAGNOSIS — S42.344A CLOSED NONDISPLACED SPIRAL FRACTURE OF SHAFT OF RIGHT HUMERUS, INITIAL ENCOUNTER: Primary | ICD-10-CM

## 2023-02-20 NOTE — PROGRESS NOTES
Daily Note     Today's date: 2023  Patient name: Melissa Chavis  : 1950  MRN: [id number]  Referring provider: Guanaco Holland DO  Dx:   Encounter Diagnosis     ICD-10-CM    1  Closed nondisplaced spiral fracture of shaft of right humerus, initial encounter  S42 344A           Start Time: 1030  Stop Time: 1125  Total time in clinic (min): 55 minutes    Subjective: Pt reported that her shoulder has been feeling better for the past few days and her muscle spasms have been getting progressively better but still has them occasionally  Objective: See treatment diary below      Assessment: Pt tolerated manual R shoulder PROM and R elbow PROM well with reported decrease in tightness post treatment  Pt tolerated addition of new R shoulder AAROM activities well but required mild to moderate verbal cueing and demonstration to complete with proper form  Pt would benefit from continued skilled PT to improve R shoulder ROM, R elbow mobility, and to recover R shoulder function to return to PLOF  Plan: Continue per plan of care  Progress treatment as tolerated  Precautions: R humerus Fx on 22, out of [place] brace on 2/10/23    (Armenian speaking only)    MD script: "Patient should start pendulum exercises to the right upper extremity for 1 week starting the day she comes out of the brace (23)   She can then progress to active assisted range of motion exercises to the right upper extremity for 1 to 2 weeks   She can then progress to active range of motion, gentle strengthening, and weightbearing to the right upper extremity "    Date 2/10 2 13 2/16 2/20         Visit # 1 2 3 4         FOTO IE             Re-eval IE              Manuals 2/10 2/13 2/16 2/20         R shoulder PROM SF DK DK DK         R elbow PROM   DK DK                                   Neuro Re-Ed 2/10  2/16          Scap retraction 5x10" 10x10" 20x5" 20x5"         Gripper  2x10 red 20x green 20x green         Flexbar for wrist 10x ea red 10x ea red 10x ea red                                                Pt Edu  DK  DK         Ther Ex 2/10  2/16 2/20         Pulleys gentle 5' 5' 5'         Elbow flex/ext PROM 10x ea 2x10 20x 20x         Pendulums 2x30"  3x30" 3x30" 3x30"         Table slides  10x5" 10x5" 10x5"         SB rolling   20x5" 20x5"         Shoulder AAROM hold   20x abd, 20x flex w pvc         Passive elbow flexion str  3x30" hang off table with 2# 3x1' hang off table 2# 1x1' 3#, 2x30" 3#                      Ther Activity                                       Gait Training                                       Modalities

## 2023-02-23 ENCOUNTER — OFFICE VISIT (OUTPATIENT)
Dept: PHYSICAL THERAPY | Facility: CLINIC | Age: 73
End: 2023-02-23

## 2023-02-23 DIAGNOSIS — S42.344A CLOSED NONDISPLACED SPIRAL FRACTURE OF SHAFT OF RIGHT HUMERUS, INITIAL ENCOUNTER: Primary | ICD-10-CM

## 2023-02-23 NOTE — PROGRESS NOTES
Daily Note     Today's date: 2023  Patient name: Noni Ramirez  : 1950  MRN: [id number]  Referring provider: Usman Carson DO  Dx:   Encounter Diagnosis     ICD-10-CM    1  Closed nondisplaced spiral fracture of shaft of right humerus, initial encounter  S42 344A           Start Time: 1030  Stop Time: 1102  Total time in clinic (min): 32 minutes    Subjective: pt reports that her shoulder had been a little more sore because she's moving it more, but that it's not bad    Objective: See treatment diary below      Assessment: Tolerated treatment well  Patient demonstrated fatigue post treatment, exhibited good technique with therapeutic exercises and would benefit from continued PT  Pt is accompanied by her son to translate [place]ian  Pt tolerated today's session well as per appropriate response to today's intervention  She demonstrated moderate muscle guarding with PROM, encouraged to relax and focus on breathing  She demonstrated improved activity tolerance and spasm with pulling, added table slides in Scap  Pt would benefit from continued, skilled PT to improve impairments to improve QoL  1:1 with Harry De Jesus DPT for entirety of tx  Plan: Continue per plan of care  Precautions: R humerus Fx on 22, out of [place] brace on 2/10/23    (Albanian speaking only)    MD script: "Patient should start pendulum exercises to the right upper extremity for 1 week starting the day she comes out of the brace (23)   She can then progress to active assisted range of motion exercises to the right upper extremity for 1 to 2 weeks   She can then progress to active range of motion, gentle strengthening, and weightbearing to the right upper extremity "    Date 2/10 2 13 2/16 2/20 2/23        Visit # 1 2 3 4 5        FOTO IE    45        Re-eval IE              Manuals 2/10 2/13 2/16 2/20 2/23        R shoulder PROM SF DK DK DK BV        R elbow PROM   DK DK BV                                  Neuro Re-Ed 2/10  2/16  2/23        Scap retraction 5x10" 10x10" 20x5" 20x5"         Gripper  2x10 red 20x green 20x green         Flexbar for wrist   10x ea red 10x ea red 10x ea red                                                Pt Edu  DK  DK         Ther Ex 2/10  2/16 2/20 2/23        Pulleys gentle 5' 5' 5' 5'        Elbow flex/ext PROM 10x ea 2x10 20x 20x         Pendulums 2x30"  3x30" 3x30" 3x30"         Table slides  10x5" 10x5" 10x5" 10"x  10   Flex/Scap        SB rolling   20x5" 20x5"         Shoulder AAROM hold   20x abd, 20x flex w pvc         Passive elbow flexion str  3x30" hang off table with 2# 3x1' hang off table 2# 1x1' 3#, 2x30" 3#                      Ther Activity     2/23                                  Gait Training                                       Modalities

## 2023-02-27 ENCOUNTER — OFFICE VISIT (OUTPATIENT)
Dept: PHYSICAL THERAPY | Facility: CLINIC | Age: 73
End: 2023-02-27

## 2023-02-27 DIAGNOSIS — S42.344A CLOSED NONDISPLACED SPIRAL FRACTURE OF SHAFT OF RIGHT HUMERUS, INITIAL ENCOUNTER: Primary | ICD-10-CM

## 2023-02-27 NOTE — PROGRESS NOTES
Daily Note     Today's date: 2023  Patient name: Evonne Griffin  : 1950  MRN: [id number]  Referring provider: Rambo Amezcua DO  Dx:   Encounter Diagnosis     ICD-10-CM    1  Closed nondisplaced spiral fracture of shaft of right humerus, initial encounter  S42 344A           Start Time: 1040  Stop Time: 1125  Total time in clinic (min): 45 minutes    Subjective: Pt stated that she is doing well today and that she is going to be seeing the doctor again tomorrow for a follow up  Objective: See treatment diary below      Assessment: Pt demonstrated improved R shoulder ROM during pulley activity at beginning of session to warm up, progressing to higher pulleys  Pt tolerated R shoulder PROM well with observable improvement of R shoulder flexion, pt continues to be limited in R shoulder abduction with mild discomfort past 90 degrees of abduction  Pt continues to experience mild cramps with R shoulder AAROM activities but have decreased in frequency during session  Pt would benefit from continued skilled PT to improve R shoulder ROM and to return to PLOF  Plan: Continue per plan of care  Progress treatment as tolerated  Precautions: R humerus Fx on 22, out of [place] brace on 2/10/23    (Greek speaking only)    MD script: "Patient should start pendulum exercises to the right upper extremity for 1 week starting the day she comes out of the brace (23)   She can then progress to active assisted range of motion exercises to the right upper extremity for 1 to 2 weeks   She can then progress to active range of motion, gentle strengthening, and weightbearing to the right upper extremity "    Date 2/10 2 13 2/16 2/20 2/23 2/27       Visit # 1 2 3 4 5 6       FOTO IE    45        Re-eval IE              Manuals 2/10 2/13 2/16 2/20 2/23 2/27       R shoulder PROM SF DK DK DK BV DK       R elbow PROM   DK DK BV DK                                 Neuro Re-Ed 2/10  2/16  2/23 2/27       Scap retraction 5x10" 10x10" 20x5" 20x5"         Gripper  2x10 red 20x green 20x green         Flexbar for wrist   10x ea red 10x ea red 10x ea red                                                Pt Edu  DK  DK  DK       Ther Ex 2/10  2/16 2/20 2/23 2/27       Pulleys gentle 5' 5' 5' 5' 5'       Elbow flex/ext PROM 10x ea 2x10 20x 20x  20x       Pendulums 2x30"  3x30" 3x30" 3x30"  3x30"       Table slides  10x5" 10x5" 10x5" 10"x  10   Flex/Scap 10x10" flex       SB rolling   20x5" 20x5"  20x5"       Shoulder AAROM hold   20x abd, 20x flex w pvc  20x abd, flex supine       Passive elbow flexion str  3x30" hang off table with 2# 3x1' hang off table 2# 1x1' 3#, 2x30" 3#  3x1' 3#                    Ther Activity     2/23                                  Gait Training                                       Modalities

## 2023-02-28 ENCOUNTER — APPOINTMENT (OUTPATIENT)
Dept: RADIOLOGY | Facility: AMBULARY SURGERY CENTER | Age: 73
End: 2023-02-28
Attending: STUDENT IN AN ORGANIZED HEALTH CARE EDUCATION/TRAINING PROGRAM

## 2023-02-28 ENCOUNTER — OFFICE VISIT (OUTPATIENT)
Dept: OBGYN CLINIC | Facility: CLINIC | Age: 73
End: 2023-02-28

## 2023-02-28 VITALS
BODY MASS INDEX: 29.08 KG/M2 | SYSTOLIC BLOOD PRESSURE: 137 MMHG | DIASTOLIC BLOOD PRESSURE: 93 MMHG | HEIGHT: 62 IN | HEART RATE: 83 BPM

## 2023-02-28 DIAGNOSIS — S42.344A CLOSED NONDISPLACED SPIRAL FRACTURE OF SHAFT OF RIGHT HUMERUS, INITIAL ENCOUNTER: ICD-10-CM

## 2023-02-28 DIAGNOSIS — S42.344A CLOSED NONDISPLACED SPIRAL FRACTURE OF SHAFT OF RIGHT HUMERUS, INITIAL ENCOUNTER: Primary | ICD-10-CM

## 2023-03-02 ENCOUNTER — OFFICE VISIT (OUTPATIENT)
Dept: PHYSICAL THERAPY | Facility: CLINIC | Age: 73
End: 2023-03-02

## 2023-03-02 DIAGNOSIS — S42.344A CLOSED NONDISPLACED SPIRAL FRACTURE OF SHAFT OF RIGHT HUMERUS, INITIAL ENCOUNTER: Primary | ICD-10-CM

## 2023-03-02 NOTE — PROGRESS NOTES
Daily Note     Today's date: 3/2/2023  Patient name: Lauren Urrutia  : 1950  MRN: [id number]  Referring provider: Sherri Baires DO  Dx:   Encounter Diagnosis     ICD-10-CM    1  Closed nondisplaced spiral fracture of shaft of right humerus, initial encounter  S42 344A           Start Time: 1025  Stop Time: 1120  Total time in clinic (min): 55 minutes    Subjective: Pt reported that her visit with the doctor went well and that she was told to continue working on improving her range of motion  Pt's son present during session to help with translation  Pt stated that her shoulder has been hurting her today and feels more sore than usual       Objective: See treatment diary below      Assessment: Pt demonstrated improved R shoulder flexion ROM with pulleys at beginning of session  Pt tolerated manual R shoulder PROM well but demonstrated mild discomfort with muscle spasm when performing abduction PROM past 90 degrees  Pt had difficulty with flexion wall slides with mild discomfort due to muscle spasms, modified to perform finger ladder to point where discomfort starts  Pt tolerated ER band walkout well but required min verbal cues and demonstration to perform with proper form  Pt tolerated trial of ice at end of session due to increased pain after exercise  Pt would benefit from continued       Plan: Continue per plan of care  Progress treatment as tolerated  Precautions: R humerus Fx on 22, out of [place] brace on 2/10/23    (Macedonian speaking only)    MD script (3/2): "Weightbearing as tolerated right upper extremity   Range of motion as tolerated to the right shoulder and right elbow   Focus on range of motion of the right shoulder as patient has developed some stiffness from the use of a fracture brace and immobilization "    Date 2/10 2 13 2/16 2/20 2/23 2/27 3/2      Visit # 1 2 3 4 5 6 7      FOTO IE    45        Re-eval IE              Manuals 2/10 2/13 2/16 2/20 2/23 2/27 3/2      R shoulder PROM SF DK DK DK BV DK DK      R elbow PROM   DK DK BV DK DK                                Neuro Re-Ed 2/10  2/16  2/23 2/27 3/2      Scap retraction 5x10" 10x10" 20x5" 20x5"         Gripper  2x10 red 20x green 20x green         Flexbar for wrist   10x ea red 10x ea red 10x ea red                                                Pt Edu  DK  DK  DK DK      Ther Ex 2/10  2/16 2/20 2/23 2/27 3/2      Pulleys gentle 5' 5' 5' 5' 5' 5'      Elbow flex/ext PROM 10x ea 2x10 20x 20x  20x       Pendulums 2x30"  3x30" 3x30" 3x30"  3x30" 3x30"      Table slides  10x5" 10x5" 10x5" 10"x  10   Flex/Scap 10x10" flex       SB rolling   20x5" 20x5"  20x5" 20x5"      Shoulder AAROM hold   20x abd, 20x flex w pvc  20x abd, flex supine 20x abd standing      Passive elbow flexion str  3x30" hang off table with 2# 3x1' hang off table 2# 1x1' 3#, 2x30" 3#  3x1' 3#       Wall slides       5x5"*      Finger ladder       10x10"       ER band walkout       20x YTB      Ther Activity     2/23                                  Gait Training                                       Modalities       3/2      Ice       8' post

## 2023-03-06 ENCOUNTER — OFFICE VISIT (OUTPATIENT)
Dept: PHYSICAL THERAPY | Facility: CLINIC | Age: 73
End: 2023-03-06

## 2023-03-06 DIAGNOSIS — S42.344A CLOSED NONDISPLACED SPIRAL FRACTURE OF SHAFT OF RIGHT HUMERUS, INITIAL ENCOUNTER: Primary | ICD-10-CM

## 2023-03-06 NOTE — PROGRESS NOTES
Daily Note     Today's date: 3/6/2023  Patient name: Derenda Severin  :   MRN: [id number]  Referring provider: Sera Marie DO  Dx:   Encounter Diagnosis     ICD-10-CM    1  Closed nondisplaced spiral fracture of shaft of right humerus, initial encounter  S42 344A           Start Time: 1230  Stop Time: 1315  Total time in clinic (min): 45 minutes    Subjective: Pt stated that she has still been getting the muscles spasms but they are not as painful as they had been  Pt's son present during session to help with translating exercise instructions  Objective: See treatment diary below      Assessment: Pt continues to show improvement with shoulder flexion and abduction ROM but progress with shoulder abduction ROM is slower than shoulder flexion ROM  Pt demonstrates improved muscle guarding with manual PROM past 90 degrees of shoulder flexion and abduction  Pt requires moderate verbal cueing and demonstration to perform ER band walkout with proper form  Pt would benefit from continued skilled PT to improve R shoulder mobility, strength, endurance, and functional ability  Plan: Continue per plan of care  Progress treatment as tolerated  Precautions: R humerus Fx on 22, out of [place] brace on 2/10/23    (Divehi speaking only)    MD script (3/2): "Weightbearing as tolerated right upper extremity   Range of motion as tolerated to the right shoulder and right elbow   Focus on range of motion of the right shoulder as patient has developed some stiffness from the use of a fracture brace and immobilization "    Date 2/10 2 13 2/16 2/20 2/23 2/27 3/ 3/6     Visit # 1 2 3 4 5 6 7 8     FOTO IE    38        Re-eval IE              Manuals 2/10 2/13 2/16 2/20 2/23 2/27 3/2 3/6     R shoulder PROM SF DK DK DK BV DK DK DK     R elbow PROM   DK DK BV DK DK DK                               Neuro Re-Ed 2/10  2/16  2/23 2/27 3 3/     Scap retraction 5x10" 10x10" 20x5" 20x5"         Gripper  2x10 red 20x green 20x green         Flexbar for wrist   10x ea red 10x ea red 10x ea red                                                Pt Edu  DK  DK  DK DK DK     Ther Ex 2/10  2/16 2/20 2/23 2/27 3/2 3/6     Pulleys gentle 5' 5' 5' 5' 5' 5' 5'     Elbow flex/ext PROM 10x ea 2x10 20x 20x  20x  20x     Pendulums 2x30"  3x30" 3x30" 3x30"  3x30" 3x30"      Table slides  10x5" 10x5" 10x5" 10"x  10   Flex/Scap 10x10" flex       SB rolling   20x5" 20x5"  20x5" 20x5"      Shoulder AAROM hold   20x abd, 20x flex w pvc  20x abd, flex supine 20x abd standing 20x abd standing, flex supine     Passive elbow flexion str  3x30" hang off table with 2# 3x1' hang off table 2# 1x1' 3#, 2x30" 3#  3x1' 3#  2x1' 4#, 1x1' 3#     Wall slides       5x5"*      Finger ladder       10x10"  10x10" abd     ER band walkout       20x YTB 20x RTB     Ther Activity     2/23                                  Gait Training                                       Modalities       3/2      Ice       8' post

## 2023-03-10 ENCOUNTER — OFFICE VISIT (OUTPATIENT)
Dept: PHYSICAL THERAPY | Facility: CLINIC | Age: 73
End: 2023-03-10

## 2023-03-10 DIAGNOSIS — S42.344A CLOSED NONDISPLACED SPIRAL FRACTURE OF SHAFT OF RIGHT HUMERUS, INITIAL ENCOUNTER: Primary | ICD-10-CM

## 2023-03-10 NOTE — PROGRESS NOTES
Daily Note     Today's date: 3/10/2023  Patient name: Veola Cushing  : 1950  MRN: [id number]  Referring provider: Daisy Gallardo DO  Dx:   Encounter Diagnosis     ICD-10-CM    1  Closed nondisplaced spiral fracture of shaft of right humerus, initial encounter  S42 344A           Start Time: 1100  Stop Time: 1140  Total time in clinic (min): 40 minutes    Subjective: Pt reported that she has been able to reach higher up with her R UE and has been in less pain  She also stated that the muscle spasms still occur with overhead activities occasionally and have not changed much pain wise  Pt son present during session to help with translation  Objective: See treatment diary below      Assessment: Pt tolerated manual R shoulder PROM well and had observable improvement in abduction ROM with decreased muscle guarding, pt however did have muscle spasm at end of manual PROM but resolved quickly  Pt also demonstrates improved AAROM with mobility exercises throughout session without increase of pain and without muscle spasms  Pt tolerated trial of active ER with light resistance well and would benefit from continued performance  Pt would benefit from continued skilled PT to improve R shoulder ROM, strength, and functional ability  Plan: Continue per plan of care  Progress treatment as tolerated  Precautions: R humerus Fx on 22, out of [place] brace on 2/10/23    (Spanish speaking only)    MD script (3/2): "Weightbearing as tolerated right upper extremity   Range of motion as tolerated to the right shoulder and right elbow   Focus on range of motion of the right shoulder as patient has developed some stiffness from the use of a fracture brace and immobilization "    Date 2/10 2 13 2/16 2/20 2/23 2/27 3/2 3/6 3/10    Visit # 1 2 3 4 5 6 7 8 9    FOTO IE    45        Re-eval IE              Manuals 2/10 2/13 2/16 2/20 2/23 2/27 3/2 3/6 3/10    R shoulder PROM SF DK DK DK BV DK LASHONDA DK DK    R elbow PROM   DK DK BV DK DK DK DK                              Neuro Re-Ed 2/10  2/16  2/23 2/27 3/2 3/6     Scap retraction 5x10" 10x10" 20x5" 20x5"         Gripper  2x10 red 20x green 20x green         Flexbar for wrist   10x ea red 10x ea red 10x ea red                                                Pt Edu  DK  DK  DK DK DK     Ther Ex 2/10  2/16 2/20 2/23 2/27 3/2 3/6 3/10    Pulleys gentle 5' 5' 5' 5' 5' 5' 5' 5'    Elbow flex/ext PROM 10x ea 2x10 20x 20x  20x  20x     Pendulums 2x30"  3x30" 3x30" 3x30"  3x30" 3x30"      Table slides  10x5" 10x5" 10x5" 10"x  10   Flex/Scap 10x10" flex       SB rolling   20x5" 20x5"  20x5" 20x5"      Shoulder AAROM hold   20x abd, 20x flex w pvc  20x abd, flex supine 20x abd standing 20x abd standing, flex supine 20x flex supine, abd standing    Passive elbow flexion str  3x30" hang off table with 2# 3x1' hang off table 2# 1x1' 3#, 2x30" 3#  3x1' 3#  2x1' 4#, 1x1' 3# 3x1' 4#    Wall slides       5x5"*      Finger ladder       10x10"  10x10" abd 10x10"abd, flex    ER         20x RTB    ER band walkout       20x YTB 20x RTB     Ther Activity     2/23                                  Gait Training                                       Modalities       3/2      Ice       8' post

## 2023-03-13 ENCOUNTER — OFFICE VISIT (OUTPATIENT)
Dept: PHYSICAL THERAPY | Facility: CLINIC | Age: 73
End: 2023-03-13

## 2023-03-13 DIAGNOSIS — S42.344A CLOSED NONDISPLACED SPIRAL FRACTURE OF SHAFT OF RIGHT HUMERUS, INITIAL ENCOUNTER: Primary | ICD-10-CM

## 2023-03-13 NOTE — PROGRESS NOTES
Daily Note     Today's date: 3/13/2023  Patient name: Neil Puckett  : 1950  MRN: [id number]  Referring provider: Fab Joiner DO  Dx:   Encounter Diagnosis     ICD-10-CM    1  Closed nondisplaced spiral fracture of shaft of right humerus, initial encounter  S42 344A           Start Time: 1103  Stop Time: 1141  Total time in clinic (min): 38 minutes    Subjective: Pt reports that she is good today and does not have a lot of pain at rest but is still having some muscle spasms with activity  Objective: See treatment diary below      Assessment: Pt demonstrated improved abduction ROM with activity and with manual PROM at beginning of session with decreased pain  Pt also did not experience any muscle spasms today during activity  Pt performed gentle strengthening exercises well, but required demonstration to perform with proper form  Pt would benefit from continued skilled PT to improve R shoulder mobility, strength, and endurance  Plan: Continue per plan of care  Progress treatment as tolerated  Precautions: R humerus Fx on 22, out of [place] brace on 2/10/23    (Amharic speaking only)    MD script (3/2): "Weightbearing as tolerated right upper extremity   Range of motion as tolerated to the right shoulder and right elbow   Focus on range of motion of the right shoulder as patient has developed some stiffness from the use of a fracture brace and immobilization "    Date    2/20 2/23 2/27 3/2 3/6 3/10 3/13   Visit #    4 5 6 7 8 9 10   FOTO     38        Re-eval               Manuals    2/20 2/23 2/27 3/2 3/6 3/10 3/13   R shoulder PROM    DK BV DK DK DK DK DK   R elbow PROM    DK BV DK DK DK DK DK                             Neuro Re-Ed     2/23 2/27 3/2 3/6  3/13   Scap retraction    20x5"         Gripper    20x green         Flexbar for wrist     10x ea red                                                Pt Edu    DK  DK DK DK  DK   Ther Ex    2/20 2/23 2/27 3/2 3/6 3/10 3/13 Pulleys    5' 5' 5' 5' 5' 5' 5'   Elbow flex/ext PROM    20x  20x  20x     Pendulums    3x30"  3x30" 3x30"      Table slides    10x5" 10"x  10   Flex/Scap 10x10" flex       SB rolling    20x5"  20x5" 20x5"      Shoulder AAROM    20x abd, 20x flex w pvc  20x abd, flex supine 20x abd standing 20x abd standing, flex supine 20x flex supine, abd standing 20x flex supine, abd stand   Passive elbow flexion str    1x1' 3#, 2x30" 3#  3x1' 3#  2x1' 4#, 1x1' 3# 3x1' 4# 3x1' 4#   Wall slides       5x5"*      Finger ladder       10x10"  10x10" abd 10x10"abd, flex 10x10" flex, abd   ER         20x RTB 20x RTB   Rows/exts          20x rows RTB   Shoulder raises             ER band walkout       20x YTB 20x RTB     Ther Activity     2/23                                  Gait Training                                       Modalities       3/2      Ice       8' post

## 2023-03-16 ENCOUNTER — OFFICE VISIT (OUTPATIENT)
Dept: PHYSICAL THERAPY | Facility: CLINIC | Age: 73
End: 2023-03-16

## 2023-03-16 DIAGNOSIS — S42.344A CLOSED NONDISPLACED SPIRAL FRACTURE OF SHAFT OF RIGHT HUMERUS, INITIAL ENCOUNTER: Primary | ICD-10-CM

## 2023-03-16 NOTE — PROGRESS NOTES
Daily Note     Today's date: 3/16/2023  Patient name: Lauren Urrutia  : 1950  MRN: [id number]  Referring provider: Sherri Baires DO  Dx:   Encounter Diagnosis     ICD-10-CM    1  Closed nondisplaced spiral fracture of shaft of right humerus, initial encounter  S42 344A           Start Time: 1110  Stop Time: 1200  Total time in clinic (min): 50 minutes    Subjective: Pt stated that she is doing well today and has been experiencing less muscle spasms with activity  Objective: See treatment diary below      Assessment: Pt continues to show observable improvement with R shoulder flexion and abduction ROM with mobility exercises and manual PROM  Pt also tolerated R shoulder AROM with 1# weight well today during session and would benefit from continued progression  Pt also tolerated addition of new strengthening exercises but required min verbal cueing to perform with proper form  Pt would benefit from continued skilled PT to improve R shoulder mobility, strength, and functional ability  Plan: Continue per plan of care  Progress treatment as tolerated  Re-evaluation next visit  Precautions: R humerus Fx on 22, out of [place] brace on 2/10/23    (Greek speaking only)    MD script (3/2): "Weightbearing as tolerated right upper extremity   Range of motion as tolerated to the right shoulder and right elbow   Focus on range of motion of the right shoulder as patient has developed some stiffness from the use of a fracture brace and immobilization "    Date 3/16   2/20 2/23 2/27 3/2 3/6 3/10 3/13   Visit # 11   4 5 6 7 8 9 10   FOTO     38        Re-eval               Manuals 3/16   2/20 2/23 2/27 3/2 3/6 3/10 3/13   R shoulder PROM DK   DK BV DK DK DK DK DK   R elbow PROM DK   DK BV DK DK DK DK DK                             Neuro Re-Ed 3/16    2/23 2/27 3/2 3/6  3/13   Scap retraction    20x5"         Gripper    20x green         Flexbar for wrist     10x ea red Pt Edu    DK  DK DK DK  DK   Ther Ex 3/16   2/20 2/23 2/27 3/2 3/6 3/10 3/13   Pulleys 5'   5' 5' 5' 5' 5' 5' 5'   Elbow flex/ext PROM 10x   20x  20x  20x     Pendulums 3x30"   3x30"  3x30" 3x30"      Table slides    10x5" 10"x  10   Flex/Scap 10x10" flex       SB rolling    20x5"  20x5" 20x5"      Shoulder AAROM    20x abd, 20x flex w pvc  20x abd, flex supine 20x abd standing 20x abd standing, flex supine 20x flex supine, abd standing 20x flex supine, abd stand   Passive elbow flexion str 3x1' 5#   1x1' 3#, 2x30" 3#  3x1' 3#  2x1' 4#, 1x1' 3# 3x1' 4# 3x1' 4#   Wall slides       5x5"*      Finger ladder 10x10" abd      10x10"  10x10" abd 10x10"abd, flex 10x10" flex, abd   ER/IR 20x GTB         20x RTB 20x RTB                Rows/exts 20x ea GTB         20x rows RTB   Shoulder raises 2x10 1# flex            ER band walkout       20x YTB 20x RTB                  Ther Activity     2/23                                  Gait Training                                       Modalities       3/2      Ice       8' post

## 2023-03-20 ENCOUNTER — EVALUATION (OUTPATIENT)
Dept: PHYSICAL THERAPY | Facility: CLINIC | Age: 73
End: 2023-03-20

## 2023-03-20 DIAGNOSIS — S42.344A CLOSED NONDISPLACED SPIRAL FRACTURE OF SHAFT OF RIGHT HUMERUS, INITIAL ENCOUNTER: Primary | ICD-10-CM

## 2023-03-20 NOTE — PROGRESS NOTES
Re-evaluation    Today's date: 3/20/2023  Patient name: Shefali Wall  : 1950  MRN: [id number]  Referring provider: Nino Salas DO  Dx:   Encounter Diagnosis     ICD-10-CM    1  Closed nondisplaced spiral fracture of shaft of right humerus, initial encounter  S42 344A           Start Time: 1030  Stop Time: 1130  Total time in clinic (min): 60 minutes    Subjective: Pt reported that she has been feeling a lot better over the past week and has only had a couple muscle spasms  She does not have any pain at beginning of session  Objective: See treatment diary below    Active Range of Motion     Left Elbow   Normal active range of motion    Right Elbow   Flexion: 145 degrees  Extension: 2 degrees    Right Shoulder   Flexion:145 degrees   Abduction: 125 degrees     Left Shoulder:   Flexion: 175 degrees  Abduction: 175 degrees    Passive Range of Motion  Right shoulder  Flexion: 155 degrees with pain  Abduction: 135 degrees with pain  R Elbow  Flexion: 150 degrees  Extension: 0 degrees    Strength:  Right shoulder  Flexion: 4-/5  Abduction: 4-/5  ER: 3/5 with pain   IR: 4-/5    Right Elbow  Flexion: 4+/5  Extension: 4-/5    Left shoulder  WNL    Left Elbow  WNL        Assessment: Pt was re evaluated today  Pt demonstrated significant improvement in R shoulder flexion and abduction AROM, but continues to be limited in both towards end range due to pain, pt would benefit from continued ROM activities  Pt also demonstrated improved R shoulder and elbow strength, but had mild to moderate pain with resisted ER  Pt also shows signifcant improvement in R elbow ROM but continues to demonstrate slight R elbow extension AROM deficit due to biceps tightness  Pt would benefit from continued skilled PT to improve R shoulder mobility, strength, elbow mobility, and functional ability  Goals  ST  Pt will reduce pain to 2/10  Met (3/20)  2  Pt will improve R shoulder flexion PROM to 120 degrees   Met (3/20)  LT  Pt will improve R shoulder flexion AROM to 110 degrees for improved ability to reach overhead  Met (3/20) Modify:     R shoulder flexion AROM to 170 for improved ability to reach overhead  2  Pt will improve R shoulder flex and ABD strength to 4/5 for improved ability to lift  3  Pt will be I w HEP  Plan: Continue per plan of care  Progress treatment as tolerated  Plan to stretch b/l UT nv  Precautions: R humerus Fx on 22, out of [place] brace on 2/10/23    (Armenian speaking only)    MD script (3/2): "Weightbearing as tolerated right upper extremity   Range of motion as tolerated to the right shoulder and right elbow   Focus on range of motion of the right shoulder as patient has developed some stiffness from the use of a fracture brace and immobilization "    Date 3/16 3/20  2/20 2/23 2/27 3/2 3/6 3/10 3/13   Visit # 11 12  4 5 6 7 8 9 10   FOTO  53/61   38        Re-eval               Manuals 3/16 3/20  2/20 2/23 2/27 3/2 3/6 3/10 3/13   R shoulder PROM DK DK  DK BV DK DK DK DK DK   R elbow PROM DK DK  DK BV DK DK DK DK DK                             Neuro Re-Ed 3/16 3/20   2/23 2/27 3/2 3/6  3/13   Scap retraction    20x5"         Gripper    20x green         Flexbar for wrist     10x ea red                                                Pt Edu    DK  DK DK DK  DK   Ther Ex 3/16 3/20  2/20 2/23 2/27 3/2 3/6 3/10 3/13   Pulleys 5' 5'  5' 5' 5' 5' 5' 5' 5'   Elbow flex/ext PROM 10x   20x  20x  20x     Pendulums 3x30" 3x30"  3x30"  3x30" 3x30"      Table slides    10x5" 10"x  10   Flex/Scap 10x10" flex       SB rolling    20x5"  20x5" 20x5"      Shoulder AAROM  20x flex supine,  20x abd, 20x flex w pvc  20x abd, flex supine 20x abd standing 20x abd standing, flex supine 20x flex supine, abd standing 20x flex supine, abd stand   Passive elbow flexion str 3x1' 5# 3x1' 5#  1x1' 3#, 2x30" 3#  3x1' 3#  2x1' 4#, 1x1' 3# 3x1' 4# 3x1' 4#   Wall slides       5x5"*      Finger ladder 10x10" abd 10x10" flex, abd     10x10"  10x10" abd 10x10"abd, flex 10x10" flex, abd   ER/IR 20x GTB         20x RTB 20x RTB   S/l ER   3x10 1# RUE           Rows/exts 20x ea GTB         20x rows RTB   Shoulder raises 2x10 1# flex            ER band walkout       20x YTB 20x RTB                  Ther Activity     2/23                                  Gait Training                                       Modalities       3/2      Ice       8' post

## 2023-03-23 ENCOUNTER — OFFICE VISIT (OUTPATIENT)
Dept: PHYSICAL THERAPY | Facility: CLINIC | Age: 73
End: 2023-03-23

## 2023-03-23 DIAGNOSIS — S42.344A CLOSED NONDISPLACED SPIRAL FRACTURE OF SHAFT OF RIGHT HUMERUS, INITIAL ENCOUNTER: Primary | ICD-10-CM

## 2023-03-23 NOTE — PROGRESS NOTES
Daily Note     Today's date: 3/23/2023  Patient name: Trey Al  : 1950  MRN: [id number]  Referring provider: Ceci Bardales DO  Dx:   Encounter Diagnosis     ICD-10-CM    1  Closed nondisplaced spiral fracture of shaft of right humerus, initial encounter  S42 344A           Start Time: 1030  Stop Time: 1120  Total time in clinic (min): 50 minutes    Subjective: Pt son present during session to help with translation  Pt reported that she is doing well today  Objective: See treatment diary below      Assessment: Pt completed session today without any muscle spasms during or after activities  Pt tolerated addition of R shoulder AROM exercises well and would benefit from continued performance with progressively higher ranges for flexion and abduction  Pt also tolerated mild progression of resistance with scapular and shoulder strengthening exercises well and would benefit from continued progression of resistance to return to PLOF  Pt continues to show improvements in R shoulder AROM and strength and would benefit from continued skilled PT to improve functional ability  Plan: Continue per plan of care  Progress treatment as tolerated  Precautions: R humerus Fx on 22, out of [place] brace on 2/10/23    (Thai speaking only)    MD script (3/2): "Weightbearing as tolerated right upper extremity   Range of motion as tolerated to the right shoulder and right elbow   Focus on range of motion of the right shoulder as patient has developed some stiffness from the use of a fracture brace and immobilization "    Date 3/16 3/20 3/23    3/2 3/6 3/10 3/13   Visit # 11 12 13    7 8 9 10   FOTO  53/61           Re-eval               Manuals 3/16 3/20 3/23    3/2 3/6 3/10 3/13   R shoulder PROM DK DK DK    DK DK DK DK   R elbow PROM DK DK DK    DK DK DK DK                             Neuro Re-Ed 3/16 3/20 3/23    3/2 3/6  3/13   Scap retraction             Gripper             Flexbar for wrist No monies   2x10 OTB                                    Pt Edu       DK DK  DK   Ther Ex 3/16 3/20 3/23    3/2 3/6 3/10 3/13   Pulleys 5' 5' 5'    5' 5' 5' 5'   Elbow flex/ext PROM 10x       20x     Pendulums 3x30" 3x30"     3x30"      Table slides             SB rolling       20x5"      Shoulder AAROM  20x flex supine,     20x abd standing 20x abd standing, flex supine 20x flex supine, abd standing 20x flex supine, abd stand   Passive elbow flexion str 3x1' 5# 3x1' 5# 3x1'      2x1' 4#, 1x1' 3# 3x1' 4# 3x1' 4#   Wall slides   10x10" flex, abd    5x5"*      Finger ladder 10x10" abd 10x10" flex, abd     10x10"  10x10" abd 10x10"abd, flex 10x10" flex, abd   ER/IR 20x GTB   20x BTB      20x RTB 20x RTB   S/l ER   3x10 1# RUE           Rows/exts 20x ea GTB  20x 10#       20x rows RTB   Shoulder raises 2x10 1# flex  2x10 1# flex, 0# abd          ER band walkout       20x YTB 20x RTB     Shoulder flex cones   3x5 cones          Wall slides   10x10" flex, abd          Ther Activity                                       Gait Training                                       Modalities       3/2      Ice       8' post

## 2023-03-27 ENCOUNTER — OFFICE VISIT (OUTPATIENT)
Dept: PHYSICAL THERAPY | Facility: CLINIC | Age: 73
End: 2023-03-27

## 2023-03-27 DIAGNOSIS — S42.344A CLOSED NONDISPLACED SPIRAL FRACTURE OF SHAFT OF RIGHT HUMERUS, INITIAL ENCOUNTER: Primary | ICD-10-CM

## 2023-03-27 NOTE — PROGRESS NOTES
"Daily Note     Today's date: 3/27/2023  Patient name: Lam Ivey  : 1950  MRN: [id number]  Referring provider: Mario Bui DO  Dx:   Encounter Diagnosis     ICD-10-CM    1  Closed nondisplaced spiral fracture of shaft of right humerus, initial encounter  S42 344A           Start Time: 1030  Stop Time: 1120  Total time in clinic (min): 50 minutes    Subjective: Pt son present during session to help with translation  Pt stated that the shoulder has been feeling better but has been having some tightness/soreness in the R wrist       Objective: See treatment diary below      Assessment: Pt demonstrated improved ROM with manual PROM at beginning of session into flexion and abduction without increase in pain during or after manual treatment  Pt showed improvement in R shoulder flexion AROM with cone activity to higher surface on rack, would benefit from continued progression of activity  Pt also demonstrates slight improvement in R shoulder strength and tolerates progression of resistance of R shoulder activities well  Pt tolerated trial of UBE well at end of session without increase in pain  Pt would benefit from continued skilled PT to improve R shoulder strength, mobility, and functional ability  Plan: Continue per plan of care  Progress treatment as tolerated  Precautions: R humerus Fx on 22, out of [place] brace on 2/10/23  (Italian speaking only)    MD script (3/2): \"Weightbearing as tolerated right upper extremity   Range of motion as tolerated to the right shoulder and right elbow   Focus on range of motion of the right shoulder as patient has developed some stiffness from the use of a fracture brace and immobilization  \"    Date 3/16 3/20 3/23 3/27   3/2 3/6 3/10 3/13   Visit # 11 12 13 14   7 8 9 10   FOTO  53/61           Re-eval               Manuals 3/16 3/20 3/23 3/27   3/2 3/6 3/10 3/13   R shoulder PROM DK DK DK DK   DK DK DK DK   R elbow PROM DK DK DK DK   DK DK DK DK    " "                         Neuro Re-Ed 3/16 3/20 3/23 3/27   3/2 3/6  3/13   Scap retraction             Gripper             Flexbar for wrist              No monies   2x10 OTB 2x10 OTB                                   Pt Edu       DK DK  DK   Ther Ex 3/16 3/20 3/23 3/27   3/2 3/6 3/10 3/13   Pulleys 5' 5' 5' 5'   5' 5' 5' 5'   Elbow flex/ext PROM 10x       20x     Pendulums 3x30\" 3x30\"     3x30\"      Table slides             SB rolling       20x5\"      Shoulder AAROM  20x flex supine,     20x abd standing 20x abd standing, flex supine 20x flex supine, abd standing 20x flex supine, abd stand   Passive elbow flexion str 3x1' 5# 3x1' 5# 3x1'      2x1' 4#, 1x1' 3# 3x1' 4# 3x1' 4#   Wall slides   10x10\" flex, abd    5x5\"*      Finger ladder 10x10\" abd 10x10\" flex, abd     10x10\"  10x10\" abd 10x10\"abd, flex 10x10\" flex, abd   ER/IR 20x GTB   20x BTB 20x BTB     20x RTB 20x RTB   S/l ER   3x10 1# RUE  2x10 2#         Rows/exts 20x ea GTB  20x 10# 20x 12#      20x rows RTB   Shoulder raises 2x10 1# flex  2x10 1# flex, 0# abd 2x10 3# flex, 2# abd         ER band walkout       20x YTB 20x RTB     Shoulder flex cones   3x5 cones 5x5 cones         Wall slides   10x10\" flex, abd          UBE    3' fwd         Ther Activity                                       Gait Training                                       Modalities       3/2      Ice       8' post                        "

## 2023-03-30 ENCOUNTER — APPOINTMENT (OUTPATIENT)
Dept: PHYSICAL THERAPY | Facility: CLINIC | Age: 73
End: 2023-03-30

## 2023-04-03 ENCOUNTER — APPOINTMENT (OUTPATIENT)
Dept: PHYSICAL THERAPY | Facility: CLINIC | Age: 73
End: 2023-04-03

## 2023-04-06 ENCOUNTER — OFFICE VISIT (OUTPATIENT)
Dept: PHYSICAL THERAPY | Facility: CLINIC | Age: 73
End: 2023-04-06

## 2023-04-06 DIAGNOSIS — S42.344A CLOSED NONDISPLACED SPIRAL FRACTURE OF SHAFT OF RIGHT HUMERUS, INITIAL ENCOUNTER: Primary | ICD-10-CM

## 2023-04-06 NOTE — PROGRESS NOTES
"Daily Note     Today's date: 2023  Patient name: Jarvis Pena  : 1950  MRN: [id number]  Referring provider: Savanna Corrigan DO  Dx:   Encounter Diagnosis     ICD-10-CM    1  Closed nondisplaced spiral fracture of shaft of right humerus, initial encounter  S42 344A           Start Time: 1025  Stop Time: 1105  Total time in clinic (min): 40 minutes    Subjective: Pt son present during session to help with translation  Pt stated that she was sick the past week and has not done her exercises much since she became ill  Pt stated that the back of her arm has a little bit of discomfort but her motion feels pretty good  Pt stated that her hand has a bit of pain and that she noticed some swelling the other day  Objective: See treatment diary below      Assessment: Pt tolerated warm up on UBE well without increase in pain during or after activity  Pt demonstrated decreased R elbow extension during activity today, pt tolerated manual and self R elbow stretches well with reported decrease in tightness and observable improvement in R elbow extension ROM  Pt was educated to continue working on elbow stretch to improve extension ROM, pt understood education  Pt showed improvement in RUE strength during exercise today and would benefit from continued progression of resistance  Pt would benefit from continued skilled PT to improve R shoulder strength, mobility, elbow extension ROM, and functional ability  Plan: Continue per plan of care  Progress treatment as tolerated  Precautions: R humerus Fx on 22, out of [place] brace on 2/10/23  (Danish speaking only)    MD script (3/2): \"Weightbearing as tolerated right upper extremity   Range of motion as tolerated to the right shoulder and right elbow   Focus on range of motion of the right shoulder as patient has developed some stiffness from the use of a fracture brace and immobilization  \"    Date 3/16 3/20 3/23 3/27   3/2 3/6 3/10 3/13   Visit # " "11 12 13 14   7 8 9 10   FOTO  53/61           Re-eval               Manuals 3/16 3/20 3/23 3/27 4/6  3/2 3/6 3/10 3/13   R shoulder PROM DK DK DK DK DK  DK DK DK DK   R elbow PROM DK DK DK DK DK  DK DK DK DK                             Neuro Re-Ed 3/16 3/20 3/23 3/27 4/6  3/2 3/6  3/13   Scap retraction             Gripper             Flexbar for wrist              No monies   2x10 OTB 2x10 OTB         scap punch      20x 1#                     Pt Edu       DK DK  DK   Ther Ex 3/16 3/20 3/23 3/27 4/6  3/2 3/6 3/10 3/13   Pulleys 5' 5' 5' 5' 3'/3' UBE  5' 5' 5' 5'   Elbow flex/ext PROM 10x       20x     Pendulums 3x30\" 3x30\"     3x30\"      Table slides             SB rolling       20x5\"      Shoulder AAROM  20x flex supine,     20x abd standing 20x abd standing, flex supine 20x flex supine, abd standing 20x flex supine, abd stand   Passive elbow flexion str 3x1' 5# 3x1' 5# 3x1'   3x1'   2x1' 4#, 1x1' 3# 3x1' 4# 3x1' 4#   Wall slides   10x10\" flex, abd    5x5\"*      Finger ladder 10x10\" abd 10x10\" flex, abd     10x10\"  10x10\" abd 10x10\"abd, flex 10x10\" flex, abd   ER/IR 20x GTB   20x BTB 20x BTB 20x 3# ER    20x RTB 20x RTB   S/l ER   3x10 1# RUE  2x10 2# 2x10 3#        Rows/exts 20x ea GTB  20x 10# 20x 12# 20x 12#     20x rows RTB   Shoulder raises 2x10 1# flex  2x10 1# flex, 0# abd 2x10 3# flex, 2# abd 2x10 3# flex BUE        ER band walkout       20x YTB 20x RTB     Shoulder flex cones   3x5 cones 5x5 cones 5x5 cones        Wall slides   10x10\" flex, abd          Prone T's, Y's     2x10         UBE    3' fwd         Ther Activity                                       Gait Training                                       Modalities       3/2      Ice       8' post                        "

## 2023-04-17 ENCOUNTER — APPOINTMENT (OUTPATIENT)
Dept: PHYSICAL THERAPY | Facility: CLINIC | Age: 73
End: 2023-04-17

## 2023-04-24 ENCOUNTER — OFFICE VISIT (OUTPATIENT)
Dept: PHYSICAL THERAPY | Facility: CLINIC | Age: 73
End: 2023-04-24

## 2023-04-24 DIAGNOSIS — S42.344A CLOSED NONDISPLACED SPIRAL FRACTURE OF SHAFT OF RIGHT HUMERUS, INITIAL ENCOUNTER: Primary | ICD-10-CM

## 2023-04-24 NOTE — PROGRESS NOTES
Daily Note     Today's date: 2023  Patient name: Cash Harding  : 1950  MRN: [id number]  Referring provider: Madeleine Gibbs DO  Dx:   Encounter Diagnosis     ICD-10-CM    1  Closed nondisplaced spiral fracture of shaft of right humerus, initial encounter  S42 344A           Start Time: 1035  Stop Time: 1125  Total time in clinic (min): 50 minutes    Subjective: Pt son present during session to assist with translation  Pt stated that she is doing well today, she has not had any spasms in a long time but still has some pain with overhead motions  Objective: See treatment diary below    Active Range of Motion      Left Elbow   Normal active range of motion     Right Elbow   Flexion: 150 degrees  Extension: 1 degrees     Right Shoulder   Flexion:170 degrees   Abduction: 150 degrees      Left Shoulder:   Flexion: 175 degrees  Abduction: 175 degrees     Passive Range of Motion  Right shoulder  Flexion: 175 degrees with pain  Abduction: 160 degrees with pain      R Elbow  Flexion: 150 degrees  Extension: 0 degrees     Strength:  Right shoulder  Flexion: 4/5  Abduction: 4+/5  ER: 4-/5 with pain   IR: 4-/5 with pain      Right Elbow  Flexion: 4+/5  Extension: 4/5     Left shoulder  WNL     Left Elbow  WNL    Assessment: Pt tolerated increase in resistance with warm up on UBE well with no increase in pain during or after activity  Pt also performed cone activity with progression of resistance well without increase in pain during or after activity and would benefit from continued progression next visit  Pt demonstrated mild discomfort with finger ladder abduction activity but decreased as pt performed more repetitions  Pt was reassessed at end of session today, pt demonstrated good improvement in R shoulder flexion ROM, abduction strength, and elbow strength, but continues to be limited in R shoulder abduction ROM compared to LUE, but still shows improvement in ROM   Pt continues to also show difficulty "with R shoulder ER and IR strength with pain and would benefit from continued focus on progressing strength  Pt tolerated manual R shoulder PROM with reported decrease in stiffness post manual treatment  Pt would benefit from continued skilled PT to improve R shoulder abduction ROM, ER/IR strength, flexion strength, and functional ability  Plan: Continue per plan of care  Progress treatment as tolerated  Precautions: R humerus Fx on 12/26/22, out of [place] brace on 2/10/23  (Kinyarwanda speaking only)    MD script (4/11): \"Patient will continue with physical therapy  She may be weightbearing as tolerated to the right upper extremity and is allowed full range of motion  Patient does note some right wrist stiffness, this should be added to her therapy regimen  She should continue therapy until she feels she has maximized her progress  \"    Date 3/16 3/20 3/23 3/27 4/6 4/10 4/13 4/20  4/24    Visit # 11 12 13 14 15 16 17 18 19    FOTO  53/61           Re-eval               Manuals 3/16 3/20 3/23 3/27 4/6 4/10 4/13 4/20 4/24    R shoulder PROM DK DK DK DK DK DK DK DK DK    R elbow PROM DK DK DK DK DK DK DK DK DK                              Neuro Re-Ed 3/16 3/20 3/23 3/27 4/6 4/10 4/13 4/20 4/24    Scap retraction             Gripper        15x blue     Flexbar for wrist              MCP extension w web        15x yellow     No monies   2x10 OTB 2x10 OTB  2x10 GTB 2x10 GTB 2x10 GTB RUE     scap punch      20x 1# 20x 1#       Rhythmic stabs      3x30\" sup       Pt Edu        DK DK    Ther Ex 3/16 3/20 3/23 3/27 4/6 4/10 4/13 4/20 4/24    Pulleys 5' 5' 5' 5' 3'/3' UBE 3'/3' UBE 3'/3' 3'/3' 3'/3'    Elbow flex/ext PROM 10x            Pendulums 3x30\" 3x30\"           Table slides             SB rolling             Shoulder AAROM  20x flex supine,    20x flex supine 3#        Passive elbow flexion str 3x1' 5# 3x1' 5# 3x1'   3x1' 3x1' 5# 3x1' 6# 3x1' 6#     Wall slides   10x10\" flex, abd          Finger ladder 10x10\" " "abd 10x10\" flex, abd     10x10\" abd nv 10x10\" abd    ER/IR 20x GTB   20x BTB 20x BTB 20x 3# ER 20x 3# ER/IR 20x 2#/4# 2x10 3#/6#     S/l ER   3x10 1# RUE  2x10 2# 2x10 3# 2x10 4# 2x10 3#  2x10 4#    Rows/exts 20x ea GTB  20x 10# 20x 12# 20x 12# 20x 10#    20x 15#    Shoulder raises 2x10 1# flex  2x10 1# flex, 0# abd 2x10 3# flex, 2# abd 2x10 3# flex BUE 2x10 3# flex 2x10 3# flex/abd      ER band walkout             Shoulder flex cones   3x5 cones 5x5 cones 5x5 cones 10x2 1# cones 10x2 1# cones 10x5: 2-1#, 2-2#, 1-2 5# 10x5 2-2#, 2-2 5# 1-3#    Wall slides   10x10\" flex, abd          Prone T's, Y's     2x10         UBE    3' fwd         Ther Activity                                       Gait Training                                       Modalities             Ice                               "

## 2023-04-27 ENCOUNTER — OFFICE VISIT (OUTPATIENT)
Dept: PHYSICAL THERAPY | Facility: CLINIC | Age: 73
End: 2023-04-27

## 2023-04-27 DIAGNOSIS — S42.344A CLOSED NONDISPLACED SPIRAL FRACTURE OF SHAFT OF RIGHT HUMERUS, INITIAL ENCOUNTER: Primary | ICD-10-CM

## 2023-04-27 NOTE — PROGRESS NOTES
"Daily Note     Today's date: 2023  Patient name: Deejay Muller  : 1950  MRN: [id number]  Referring provider: Akbar Girder DO  Dx: No diagnosis found  Start Time: 1130  Stop Time: 1210  Total time in clinic (min): 40 minutes    Subject: Pt son present during session today to assist with translation  Pt stated that she is doing well today and has not had much pain over the past week but did stated that her fingers of her right hand feel tight  Objective: See treatment diary below      Assessment: Pt showed improvement in form with resisted ER/IR today and would benefit from slight progression of resistance next visit  Pt was challenged appropriately with cone activity with increased resistance today, demonstrating mild difficulty with 4# cone at end of set and had mild fatigue at end of activity that resolved with standing rest break  Pt tolerated R hand and wrist activities today without increase in discomfort but required mild verbal cueing to perform with proper form  Pt would benefit from addition of more scapular mobility and coordination exercises next visit  Pt tolerated manual R shoulder and elbow PROM with reported decrease in stiffness post manual treatment  Pt would benefit from continued skilled PT to improve R shoulder mobility, strength, endurance, and functional ability  Plan: Continue per plan of care  Progress treatment as tolerated  Precautions: R humerus Fx on 22, out of [place] brace on 2/10/23  (Malay speaking only)    MD script (): \"Patient will continue with physical therapy  She may be weightbearing as tolerated to the right upper extremity and is allowed full range of motion  Patient does note some right wrist stiffness, this should be added to her therapy regimen  She should continue therapy until she feels she has maximized her progress  \"    Date 3/16 3/20 3/23 3/27 4/6 4/10 4/13 4/20  4/24 4/27   Visit # 53 49 35 14 63 57 62 97 58 29 " "  FOTO  53/61        nv   Re-eval               Manuals 3/16 3/20 3/23 3/27 4/6 4/10 4/13 4/20 4/24 4/27   R shoulder PROM DK DK DK DK DK DK DK DK DK DK   R elbow PROM DK DK DK DK DK DK DK DK DK DK                             Neuro Re-Ed 3/16 3/20 3/23 3/27 4/6 4/10 4/13 4/20 4/24 4/27   Scap retraction             Gripper        15x blue  20x3\" blue   Flexbar for wrist              MCP extension w web        15x yellow  20x yellow   No monies   2x10 OTB 2x10 OTB  2x10 GTB 2x10 GTB 2x10 GTB RUE  2x10 GTB   scap punch      20x 1# 20x 1#       Ball circles           30x ea RMB   Rhythmic stabs      3x30\" sup       Pt Edu        DK DK DK   Ther Ex 3/16 3/20 3/23 3/27 4/6 4/10 4/13 4/20 4/24 4/27   Pulleys 5' 5' 5' 5' 3'/3' UBE 3'/3' UBE 3'/3' 3'/3' 3'/3'    Elbow flex/ext PROM 10x            Pendulums 3x30\" 3x30\"           Table slides             SB rolling             Shoulder AAROM  20x flex supine,    20x flex supine 3#        Passive elbow flexion str 3x1' 5# 3x1' 5# 3x1'   3x1' 3x1' 5# 3x1' 6# 3x1' 6#     Wall slides   10x10\" flex, abd          Finger ladder 10x10\" abd 10x10\" flex, abd     10x10\" abd nv 10x10\" abd    ER/IR 20x GTB   20x BTB 20x BTB 20x 3# ER 20x 3# ER/IR 20x 2#/4# 2x10 3#/6#  2x10 3#/6#   S/l ER   3x10 1# RUE  2x10 2# 2x10 3# 2x10 4# 2x10 3#  2x10 4#    Rows/exts 20x ea GTB  20x 10# 20x 12# 20x 12# 20x 10#    20x 15# 20x 15#   Shoulder raises 2x10 1# flex  2x10 1# flex, 0# abd 2x10 3# flex, 2# abd 2x10 3# flex BUE 2x10 3# flex 2x10 3# flex/abd      ER band walkout             Shoulder flex cones   3x5 cones 5x5 cones 5x5 cones 10x2 1# cones 10x2 1# cones 10x5: 2-1#, 2-2#, 1-2 5# 10x5 2-2#, 2-2 5# 1-3# 10x5 1-2#, 2-2 5#, 1-3#, 1-4#   Wall slides   10x10\" flex, abd          Prone T's, Y's     2x10         UBE    3' fwd         Ther Activity                                       Gait Training                                       Modalities             Ice                               "

## 2023-05-01 ENCOUNTER — OFFICE VISIT (OUTPATIENT)
Dept: PHYSICAL THERAPY | Facility: CLINIC | Age: 73
End: 2023-05-01

## 2023-05-01 DIAGNOSIS — S42.344A CLOSED NONDISPLACED SPIRAL FRACTURE OF SHAFT OF RIGHT HUMERUS, INITIAL ENCOUNTER: Primary | ICD-10-CM

## 2023-05-01 NOTE — PROGRESS NOTES
"Daily Note     Today's date: 2023  Patient name: Oneta Phalen  : 1950  MRN: [id number]  Referring provider: Jenifer Saha DO  Dx:   Encounter Diagnosis     ICD-10-CM    1  Closed nondisplaced spiral fracture of shaft of right humerus, initial encounter  S42 344A           Start Time: 1030  Stop Time: 1110  Total time in clinic (min): 40 minutes    Subjective: Pt son present during session to assist with translation  Pt stated that she is doing well today      Objective: See treatment diary below      Assessment: Pt tolerated manual RUE PROM with R elbow stretch with reported decrease in stiffness post manual treatment  Pt shows slight improvement in R shoulder flexion and abduction ROM but continues to have tightness towards end range flexion and mild discomfort above 120 degrees of abduction  Pt also shows slight improvement in R shoulder strength with exercises throughout session but continues to require mild verbal cues and demonstration to perform exercises with proper form  Pt is challenged appropriately with resisted cone activity well and would benefit from continued performance with gradual progression of resistance  Pt would benefit from continued skilled PT to improve R shoulder mobility, strength, elbow flexibility, and functional ability  Plan: Continue per plan of care  Progress treatment as tolerated  Precautions: R humerus Fx on 22, out of [place] brace on 2/10/23  (Irish speaking only)    MD script (): \"Patient will continue with physical therapy  She may be weightbearing as tolerated to the right upper extremity and is allowed full range of motion  Patient does note some right wrist stiffness, this should be added to her therapy regimen  She should continue therapy until she feels she has maximized her progress  \"    Date 5/1   3/27 4/6 4/10 4/13 4/20  4/24 4/27   Visit # 21   14 15 16 17 18 19 20   FOTO          nv   Re-eval               Manuals 5/1   3/27 " "4/6 4/10 4/13 4/20 4/24 4/27   R shoulder PROM DK   DK DK DK DK DK DK DK   R elbow PROM DK   DK DK DK DK DK DK DK                             Neuro Re-Ed 5/1   3/27 4/6 4/10 4/13 4/20 4/24 4/27   Scap retraction             Gripper        15x blue  20x3\" blue   Flexbar for wrist              MCP extension w web        15x yellow  20x yellow   No monies    2x10 OTB  2x10 GTB 2x10 GTB 2x10 GTB RUE  2x10 GTB   scap punch      20x 1# 20x 1#       Ball circles           30x ea RMB   Rhythmic stabs      3x30\" sup       Pt Edu        DK DK DK   Ther Ex 5/1   3/27 4/6 4/10 4/13 4/20 4/24 4/27   Pulleys 3'/3' UBE   5' 3'/3' UBE 3'/3' UBE 3'/3' 3'/3' 3'/3'    Elbow flex/ext PROM             Pendulums             Table slides             SB rolling             Shoulder AAROM      20x flex supine 3#        Passive elbow flexion str     3x1' 3x1' 5# 3x1' 6# 3x1' 6#     Wall slides             Finger ladder 10x10\" abd      10x10\" abd nv 10x10\" abd    ER/IR 2x10 3 5#/6 5#   20x BTB 20x 3# ER 20x 3# ER/IR 20x 2#/4# 2x10 3#/6#  2x10 3#/6#   S/l ER  2x15 3#   2x10 2# 2x10 3# 2x10 4# 2x10 3#  2x10 4#    Rows/exts 20x 15#   20x 12# 20x 12# 20x 10#    20x 15# 20x 15#   Shoulder raises    2x10 3# flex, 2# abd 2x10 3# flex BUE 2x10 3# flex 2x10 3# flex/abd      ER band walkout             Shoulder flex cones 10x5 1-2 5# 1- 3#, 2-4#   5x5 cones 5x5 cones 10x2 1# cones 10x2 1# cones 10x5: 2-1#, 2-2#, 1-2 5# 10x5 2-2#, 2-2 5# 1-3# 10x5 1-2#, 2-2 5#, 1-3#, 1-4#   Wall slides             Prone T's, Y's     2x10         UBE    3' fwd         Ther Activity                                       Gait Training                                       Modalities             Ice                               "

## 2023-05-04 ENCOUNTER — APPOINTMENT (OUTPATIENT)
Dept: PHYSICAL THERAPY | Facility: CLINIC | Age: 73
End: 2023-05-04
Payer: MEDICARE

## 2023-05-08 ENCOUNTER — OFFICE VISIT (OUTPATIENT)
Dept: PHYSICAL THERAPY | Facility: CLINIC | Age: 73
End: 2023-05-08

## 2023-05-08 DIAGNOSIS — S42.344A CLOSED NONDISPLACED SPIRAL FRACTURE OF SHAFT OF RIGHT HUMERUS, INITIAL ENCOUNTER: Primary | ICD-10-CM

## 2023-05-08 NOTE — PROGRESS NOTES
"Daily Note     Today's date: 2023  Patient name: Ivonne Shirley  : 1950  MRN: [id number]  Referring provider: Deb Jeff DO  Dx:   Encounter Diagnosis     ICD-10-CM    1  Closed nondisplaced spiral fracture of shaft of right humerus, initial encounter  S42 344A           Start Time: 1033  Stop Time: 1112  Total time in clinic (min): 39 minutes    Subjective: Pt son present during session to assist with translation  Pt son stated that this coming Thursday will be the last visit he will be attending with the pt and would like to figure out future appointments so that she can be with her aunt during the sessions  Objective: See treatment diary below      Assessment: Pt tolerated manual R shoulder and elbow PROM well with reported decrease in stiffness post manual treatment- pt continues to show improvement in R shoulder flexion and abduction ROM but abduction continues to be more limited than flexion, pt also shows good elbow ROM, being able to achieve full extension without discomfort  Discussed pt schedule thoroughly during session to ensure pt had proper transportation due to son being out of the country for the next couple months  Pt requires mild verbal cueing to perform shoulder ER exercise well but showed improved tolerance of exercise without increase in pain  Pt would benefit from continued skilled PT to improve R shoulder strength, ROM, and functional ability  Plan: Continue per plan of care  Progress treatment as tolerated  Precautions: R humerus Fx on 22, out of bar[place] brace on 2/10/23  (Malay speaking only)    MD script (): \"Patient will continue with physical therapy  She may be weightbearing as tolerated to the right upper extremity and is allowed full range of motion  Patient does note some right wrist stiffness, this should be added to her therapy regimen  She should continue therapy until she feels she has maximized her progress  \"    Date 5/1 5/8  3/27 " "4/6 4/10 4/13 4/20  4/24 4/27   Visit # 21 22  14 15 16 17 18 19 20   FOTO          nv   Re-eval               Manuals 5/1 5/8  3/27 4/6 4/10 4/13 4/20 4/24 4/27   R shoulder PROM DK DK  DK DK DK DK DK DK DK   R elbow PROM DK DK  DK DK DK DK DK DK DK                             Neuro Re-Ed 5/1 5/8  3/27 4/6 4/10 4/13 4/20 4/24 4/27   Scap retraction             Gripper        15x blue  20x3\" blue   Flexbar for wrist              MCP extension w web        15x yellow  20x yellow   No monies    2x10 OTB  2x10 GTB 2x10 GTB 2x10 GTB RUE  2x10 GTB   scap punch      20x 1# 20x 1#       Ball circles           30x ea RMB   Rhythmic stabs      3x30\" sup       Pt Edu  DK      DK DK DK   Ther Ex 5/1 5/8  3/27 4/6 4/10 4/13 4/20 4/24 4/27   Pulleys 3'/3' UBE   5' 3'/3' UBE 3'/3' UBE 3'/3' 3'/3' 3'/3'    Elbow flex/ext PROM             Pendulums             Table slides             SB rolling             Shoulder AAROM      20x flex supine 3#        Passive elbow flexion str     3x1' 3x1' 5# 3x1' 6# 3x1' 6#     Wall slides             Finger ladder 10x10\" abd 10x10\" abd     10x10\" abd nv 10x10\" abd    ER/IR 2x10 3 5#/6 5# 2x10 3 5# ER  20x BTB 20x 3# ER 20x 3# ER/IR 20x 2#/4# 2x10 3#/6#  2x10 3#/6#   S/l ER  2x15 3# 2x10 3#  2x10 2# 2x10 3# 2x10 4# 2x10 3#  2x10 4#    Rows/exts 20x 15# 20x 15# ea  20x 12# 20x 12# 20x 10#    20x 15# 20x 15#   Shoulder raises  2x10 3# abd  2x10 3# flex, 2# abd 2x10 3# flex BUE 2x10 3# flex 2x10 3# flex/abd      ER band walkout             Shoulder flex cones 10x5 1-2 5# 1- 3#, 2-4# 10x5 1-2 5#, 2-3#, 2-4#  5x5 cones 5x5 cones 10x2 1# cones 10x2 1# cones 10x5: 2-1#, 2-2#, 1-2 5# 10x5 2-2#, 2-2 5# 1-3# 10x5 1-2#, 2-2 5#, 1-3#, 1-4#   Wall slides             Prone T's, Y's     2x10         UBE    3' fwd         Ther Activity                                       Gait Training                                       Modalities             Ice                               "

## 2023-05-11 ENCOUNTER — EVALUATION (OUTPATIENT)
Dept: PHYSICAL THERAPY | Facility: CLINIC | Age: 73
End: 2023-05-11

## 2023-05-11 DIAGNOSIS — S42.344A CLOSED NONDISPLACED SPIRAL FRACTURE OF SHAFT OF RIGHT HUMERUS, INITIAL ENCOUNTER: Primary | ICD-10-CM

## 2023-05-11 NOTE — PROGRESS NOTES
Re-evaluation     Today's date: 2023  Patient name: Vianey Medina  : 1950  MRN: [id number]  Referring provider: Brice Sapp DO  Dx:   Encounter Diagnosis     ICD-10-CM    1  Closed nondisplaced spiral fracture of shaft of right humerus, initial encounter  S42 344A           Start Time: 1030  Stop Time: 1105  Total time in clinic (min): 35 minutes    Subjective: Pt son present during session today to assist with translation  Pt stated she is doing well today and does not have any pain at start of session  Objective: See treatment diary below    Left Elbow   Normal active range of motion     Right Elbow   Flexion: 150 degrees  Extension: 0 degrees     Right Shoulder   Flexion:170 degrees   Abduction: 165 degrees      Left Shoulder:   Flexion: 175 degrees  Abduction: 175 degrees     Passive Range of Motion  Right shoulder  Flexion: 178 degrees  Abduction: 175 degrees  ER: 45 degrees     R Elbow  Flexion: 150 degrees  Extension: 0 degrees     Strength:  Right shoulder  Flexion: 4+/5  Abduction: 4/5  ER: 4/5 with pain   IR: 4-/5 with pain      Right Elbow  Flexion: 4+/5  Extension: 4+/5     Left shoulder  WNL     Left Elbow  WNL    Assessment: Pt was reassessed today  Pt demonstrated significant improvements in R shoulder flexion and abduction ROM, pt has slightly limited R shoulder ER ROM and would benefit from continued performance of mobility exercises  Pt also showed good improvement in R shoulder strength but continues to have some pain with resisted ER and would benefit from continued focus on ER strengthening  Pt was given updated resistance bands to perform with exercises at home as part of HEP  Pt tolerated performance of R strengthening exercises well and would benefit from progression of activities next session  Pt would benefit from continued skilled PT to improve RUE strength, mobility, and functional ability  Goals  ST  Pt will reduce pain to 2/10  Met (3/20)  2   Pt will "improve R shoulder flexion PROM to 120 degrees  Met (3/20)  LT  Pt will improve R shoulder flexion AROM to 110 degrees for improved ability to reach overhead  Met (3/20) Modify:     R shoulder flexion AROM to 170 for improved ability to reach overhead  MET   2  Pt will improve R shoulder flex and ABD strength to 4/5 for improved ability to lift  Met   Modify:     abduction to 4+/5   3  Pt will be I w HEP  Met   4  Pt will improve R shoulder ER and IR strength to 4+/5 to improve ability to carry groceries ()  5  Pt will improve R shoulder ER ROM to WNL to increase tolerance of carrying objects ()  Plan: Continue per plan of care  Progress treatment as tolerated  Precautions: R humerus Fx on 22, out of [place] brace on 2/10/23  (Georgian speaking only)    MD script (): \"Patient will continue with physical therapy  She may be weightbearing as tolerated to the right upper extremity and is allowed full range of motion  Patient does note some right wrist stiffness, this should be added to her therapy regimen  She should continue therapy until she feels she has maximized her progress  \"    Date    Visit # 21 22 23    17 18 19 20   FOTO          nv   Re-eval               Manuals    R shoulder PROM DK DK DK    DK DK DK DK   R elbow PROM DK DK     DK DK DK DK                             Neuro Re-Ed    Scap retraction             Gripper        15x blue  20x3\" blue   Flexbar for wrist              MCP extension w web        15x yellow  20x yellow   No monies   20x GTB    2x10 GTB 2x10 GTB RUE  2x10 GTB   scap punch              Ball circles           30x ea RMB   Rhythmic stabs             Pt Edu  DK DK     DK DK DK   Ther Ex    Pulleys 3'/3' UBE  3'/3' UBE    3'/3' 3'/3' 3'/3'    Elbow flex/ext PROM             Pendulums             Table " "slides             SB rolling             Shoulder AAROM             Passive elbow flexion str       3x1' 6# 3x1' 6#     Wall slides             Finger ladder 10x10\" abd 10x10\" abd     10x10\" abd nv 10x10\" abd    ER/IR 2x10 3 5#/6 5# 2x10 3 5# ER 2x10 4# ER/IR    20x 2#/4# 2x10 3#/6#  2x10 3#/6#   S/l ER  2x15 3# 2x10 3# 20x 3#    2x10 3#  2x10 4#    Rows/exts 20x 15# 20x 15# ea       20x 15# 20x 15#   Shoulder raises  2x10 3# abd nv    2x10 3# flex/abd      ER band walkout             Shoulder flex cones 10x5 1-2 5# 1- 3#, 2-4# 10x5 1-2 5#, 2-3#, 2-4# 10x5 1-2 5#, 2-3#, 2-4#    10x2 1# cones 10x5: 2-1#, 2-2#, 1-2 5# 10x5 2-2#, 2-2 5# 1-3# 10x5 1-2#, 2-2 5#, 1-3#, 1-4#   Wall slides             Prone T's, Y's             UBE             Ther Activity                                       Gait Training                                       Modalities             Ice                               "

## 2023-05-15 ENCOUNTER — OFFICE VISIT (OUTPATIENT)
Dept: PHYSICAL THERAPY | Facility: CLINIC | Age: 73
End: 2023-05-15

## 2023-05-15 DIAGNOSIS — S42.344A CLOSED NONDISPLACED SPIRAL FRACTURE OF SHAFT OF RIGHT HUMERUS, INITIAL ENCOUNTER: Primary | ICD-10-CM

## 2023-05-15 NOTE — PROGRESS NOTES
"Daily Note     Today's date: 5/15/2023  Patient name: Jumana Mayberry  : 1950  MRN: [id number]  Referring provider: Beryle Gums, DO  Dx:   Encounter Diagnosis     ICD-10-CM    1  Closed nondisplaced spiral fracture of shaft of right humerus, initial encounter  S42 344A           Start Time: 1705  Stop Time: 1735  Total time in clinic (min): 30 minutes    Subjective: Pt stated that she is doing well today, but is a little bit tired at beginning of session  Objective: See treatment diary below      Assessment: Pt showed improved form with R shoulder ER strengthening activities during session and would benefit from continued performance  Pt was challenged appropriately with progression of resistance with shoulder flexion cone activity with moderate fatigue post session  Pt would benefit from continued skilled PT to improve R shoulder ROM, strength, and functional ability  Plan: Continue per plan of care  Progress treatment as tolerated  Precautions: R humerus Fx on 22, out of [place] brace on 2/10/23  (Maori speaking only)    MD script (): \"Patient will continue with physical therapy  She may be weightbearing as tolerated to the right upper extremity and is allowed full range of motion  Patient does note some right wrist stiffness, this should be added to her therapy regimen  She should continue therapy until she feels she has maximized her progress  \"    Date 5/1 5/8 5/11 5/15   4/13 4/20  4/24 4/27   Visit # 21 22 23 24   17 18 19 20   FOTO          nv   Re-eval               Manuals 5/1 5/8 5/11 5/15   4/13 4/20 4/24 4/27   R shoulder PROM DK DK DK    DK DK DK DK   R elbow PROM DK DK     DK DK DK DK                             Neuro Re-Ed 5/1 5/8 5/11 5/15   4/13 4/20 4/24 4/27   Scap retraction             Gripper        15x blue  20x3\" blue   Flexbar for wrist              MCP extension w web        15x yellow  20x yellow   No monies   20x GTB 20x GTB   2x10 GTB 2x10 GTB " FYI  "RUE  2x10 GTB   scap punch              Ball circles           30x ea RMB   Rhythmic stabs             Pt Edu  DK DK DK    DK DK DK   Ther Ex 5/1 5/8 5/11 5/15   4/13 4/20 4/24 4/27   Pulleys 3'/3' UBE  3'/3' UBE 3'/3' UBE   3'/3' 3'/3' 3'/3'    Elbow flex/ext PROM             Pendulums             Table slides             SB rolling             Shoulder AAROM             Passive elbow flexion str       3x1' 6# 3x1' 6#     Wall slides             Finger ladder 10x10\" abd 10x10\" abd     10x10\" abd nv 10x10\" abd    ER/IR 2x10 3 5#/6 5# 2x10 3 5# ER 2x10 4# ER/IR 2x10 4#    20x 2#/4# 2x10 3#/6#  2x10 3#/6#   S/l ER  2x15 3# 2x10 3# 20x 3# 20x 4#   2x10 3#  2x10 4#    Rows/exts 20x 15# 20x 15# ea  20x 15# ea     20x 15# 20x 15#   Shoulder raises  2x10 3# abd nv 2x10 3# flex and abd   2x10 3# flex/abd      ER band walkout             Shoulder flex cones 10x5 1-2 5# 1- 3#, 2-4# 10x5 1-2 5#, 2-3#, 2-4# 10x5 1-2 5#, 2-3#, 2-4# 10x5 2-3#, 2-4#, 1-5#   10x2 1# cones 10x5: 2-1#, 2-2#, 1-2 5# 10x5 2-2#, 2-2 5# 1-3# 10x5 1-2#, 2-2 5#, 1-3#, 1-4#   Wall slides             Prone T's, Y's             UBE             Ther Activity                                       Gait Training                                       Modalities             Ice                               "

## 2023-05-23 ENCOUNTER — OFFICE VISIT (OUTPATIENT)
Dept: PHYSICAL THERAPY | Facility: CLINIC | Age: 73
End: 2023-05-23

## 2023-05-23 DIAGNOSIS — S42.344A CLOSED NONDISPLACED SPIRAL FRACTURE OF SHAFT OF RIGHT HUMERUS, INITIAL ENCOUNTER: Primary | ICD-10-CM

## 2023-05-30 ENCOUNTER — OFFICE VISIT (OUTPATIENT)
Dept: PHYSICAL THERAPY | Facility: CLINIC | Age: 73
End: 2023-05-30

## 2023-05-30 DIAGNOSIS — S42.344A CLOSED NONDISPLACED SPIRAL FRACTURE OF SHAFT OF RIGHT HUMERUS, INITIAL ENCOUNTER: Primary | ICD-10-CM

## 2023-05-30 NOTE — PROGRESS NOTES
"Daily Note     Today's date: 2023  Patient name: Nazia Cruz  : 1950  MRN: [id number]  Referring provider: Bhargav Pagan DO  Dx:   Encounter Diagnosis     ICD-10-CM    1  Closed nondisplaced spiral fracture of shaft of right humerus, initial encounter  S42 344A           Start Time: 1200  Stop Time: 1245  Total time in clinic (min): 45 minutes    Subjective: Pt stated that she is doing well today but her R tricep does have a little bit of soreness today  Objective: See treatment diary below      Assessment: Discussed with pt and family pt's progress and that we will be continuing formal PT until pt has appointment her orthopedic doctor, then determine if she needs more formal skilled PT to improve function further, pt and family acknowledged instruction  Pt shows good improvement in R shoulder and elbow strength as shown by improved tolerance with increased resistance with exercise  Pt had mild discomfort with trial of 5# with R shoulder abduction, modified resistance to 4# and pt tolerated much better and was challenged appropriately  Pt would benefit from continued skilled PT to improve R shoulder strength, mobility, elbow strength, and functional ability  Plan: Continue per plan of care  Progress treatment as tolerated  Precautions: R humerus Fx on 22, out of [place] brace on 2/10/23  (Malay speaking only)    MD script (): \"Patient will continue with physical therapy  She may be weightbearing as tolerated to the right upper extremity and is allowed full range of motion  Patient does note some right wrist stiffness, this should be added to her therapy regimen  She should continue therapy until she feels she has maximized her progress  \"    Date 5/1 5/8 5/11 5/15 5/23 5/30    4/27   Visit # 21 22 23 24 25 26    20   FOTO          nv   Re-eval               Manuals 5/1 5/8 5/11 5/15 5/23 5/30    4/27   R shoulder PROM DK DK DK       DK   R elbow PROM DK DK        DK       " "                      Neuro Re-Ed 5/1 5/8 5/11 5/15 5/23 5/30    4/27   Scap retraction             Gripper      20x blue    20x3\" blue   Flexbar for wrist              MCP extension w web      20x yellow    20x yellow   No monies   20x GTB 20x GTB 20x GTB 20x GTB    2x10 GTB   scap punch              Ball circles       30x f/b RMB    30x ea RMB   Rhythmic stabs             Pt Edu  DK DK DK      DK   Ther Ex 5/1 5/8 5/11 5/15 5/23 5/30    4/27   Pulleys 3'/3' UBE  3'/3' UBE 3'/3' UBE 3'/3' UBE        Elbow flex/ext PROM             Pendulums             Table slides             SB rolling             Shoulder AAROM             Passive elbow flexion str             Wall slides             Finger ladder 10x10\" abd 10x10\" abd           ER/IR 2x10 3 5#/6 5# 2x10 3 5# ER 2x10 4# ER/IR 2x10 4#  2x10 4#  2x10 5#/7 5#    2x10 3#/6#   S/l ER  2x15 3# 2x10 3# 20x 3# 20x 4# 20x 4# 20x 5#       Rows/exts 20x 15# 20x 15# ea  20x 15# ea 20x 15# ea 20x 15# row    20x 15#   Shoulder raises  2x10 3# abd nv 2x10 3# flex and abd 2x10 3# flex and abd 2x10 5# flex, 4# abd       ER band walkout             Shoulder flex cones 10x5 1-2 5# 1- 3#, 2-4# 10x5 1-2 5#, 2-3#, 2-4# 10x5 1-2 5#, 2-3#, 2-4# 10x5 2-3#, 2-4#, 1-5# 10x5 2-3#, 2-4#, 1-5# 10x5 2-3#, 2-4#, 1-2 5#    10x5 1-2#, 2-2 5#, 1-3#, 1-4#   Elbow ext      20x 6# stand ata       Bicep curl      20x 5# RUE       Wall slides             Prone T's, Y's             UBE             Ther Activity                                       Gait Training                                       Modalities             Ice                               "

## 2023-06-06 ENCOUNTER — OFFICE VISIT (OUTPATIENT)
Dept: PHYSICAL THERAPY | Facility: CLINIC | Age: 73
End: 2023-06-06
Payer: MEDICARE

## 2023-06-06 DIAGNOSIS — S42.344A CLOSED NONDISPLACED SPIRAL FRACTURE OF SHAFT OF RIGHT HUMERUS, INITIAL ENCOUNTER: Primary | ICD-10-CM

## 2023-06-06 PROCEDURE — 97110 THERAPEUTIC EXERCISES: CPT

## 2023-06-06 PROCEDURE — 97112 NEUROMUSCULAR REEDUCATION: CPT

## 2023-06-06 NOTE — PROGRESS NOTES
"Daily Note     Today's date: 2023  Patient name: Delon Menendez  : 1950  MRN: [id number]  Referring provider: Amanda Ace DO  Dx:   Encounter Diagnosis     ICD-10-CM    1  Closed nondisplaced spiral fracture of shaft of right humerus, initial encounter  S42 344A           Start Time: 1210  Stop Time: 1240  Total time in clinic (min): 30 minutes    Subjective: Pt stated that she is doing well today but has some pain and symptoms in the R hand  Objective: See treatment diary below      Assessment: Pt tolerated today's session well with no adverse symptoms  Pt needed minimal breaks due to hand symptoms  Pt would benefit from continued skilled PT to improve R shoulder strength, mobility, elbow strength, and functional ability  Continue to work towards goals of increased shoulder strength and flexibility  Plan: Continue per plan of care  Progress treatment as tolerated  Precautions: R humerus Fx on 22, out of [place] brace on 2/10/23  (Italian speaking only)    MD script (): \"Patient will continue with physical therapy  She may be weightbearing as tolerated to the right upper extremity and is allowed full range of motion  Patient does note some right wrist stiffness, this should be added to her therapy regimen  She should continue therapy until she feels she has maximized her progress  \"    Date 5/1 5/8 5/11 5/15 5/23 5/30 6/6   4/27   Visit # 21 22 23 24 25 26 27   20   FOTO          nv   Re-eval               Manuals 5/1 5/8 5/11 5/15 5/23 5/30 6/6   4/27   R shoulder PROM DK DK DK       DK   R elbow PROM DK DK        DK                             Neuro Re-Ed 5/1 5/8 5/11 5/15 5/23 5/30 6/6   4/27   Scap retraction             Gripper      20x blue 20x blue   20x3\" blue   Flexbar for wrist              MCP extension w web      20x yellow 20x yellow   20x yellow   No monies   20x GTB 20x GTB 20x GTB 20x GTB 20x BTB   2x10 GTB   scap punch              Ball circles       30x " "f/b RMB 30x f/b RMB   30x ea RMB   Rhythmic stabs             Pt Edu  DK DK DK      DK   Ther Ex 5/1 5/8 5/11 5/15 5/23 5/30 6/6   4/27   Pulleys 3'/3' UBE  3'/3' UBE 3'/3' UBE 3'/3' UBE  3'/3' UBE      Elbow flex/ext PROM             Pendulums             Table slides             SB rolling             Shoulder AAROM             Passive elbow flexion str             Wall slides             Finger ladder 10x10\" abd 10x10\" abd           ER/IR 2x10 3 5#/6 5# 2x10 3 5# ER 2x10 4# ER/IR 2x10 4#  2x10 4#  2x10 5#/7 5# 2x10 5#/7 5#   2x10 3#/6#   S/l ER  2x15 3# 2x10 3# 20x 3# 20x 4# 20x 4# 20x 5# 20x 5#      Rows/exts 20x 15# 20x 15# ea  20x 15# ea 20x 15# ea 20x 15# row 20x 15# row   20x 15#   Shoulder raises  2x10 3# abd nv 2x10 3# flex and abd 2x10 3# flex and abd 2x10 5# flex, 4# abd 2x10 5# flex, 4# abd      ER band walkout             Shoulder flex cones 10x5 1-2 5# 1- 3#, 2-4# 10x5 1-2 5#, 2-3#, 2-4# 10x5 1-2 5#, 2-3#, 2-4# 10x5 2-3#, 2-4#, 1-5# 10x5 2-3#, 2-4#, 1-5# 10x5 2-3#, 2-4#, 1-2 5# 10x5 2-3#, 2-4#, 1-2 5#   10x5 1-2#, 2-2 5#, 1-3#, 1-4#   Elbow ext      20x 6# stand ata 20x 6# stand ata      Bicep curl      20x 5# RUE 20x 5# RUE      Wall slides             Prone T's, Y's             UBE             Ther Activity                                       Gait Training                                       Modalities             Ice                               "

## 2023-06-13 ENCOUNTER — APPOINTMENT (OUTPATIENT)
Dept: PHYSICAL THERAPY | Facility: CLINIC | Age: 73
End: 2023-06-13
Payer: MEDICARE

## 2023-06-20 ENCOUNTER — OFFICE VISIT (OUTPATIENT)
Dept: PHYSICAL THERAPY | Facility: CLINIC | Age: 73
End: 2023-06-20
Payer: MEDICARE

## 2023-06-20 DIAGNOSIS — S42.344A CLOSED NONDISPLACED SPIRAL FRACTURE OF SHAFT OF RIGHT HUMERUS, INITIAL ENCOUNTER: Primary | ICD-10-CM

## 2023-06-20 PROCEDURE — 97110 THERAPEUTIC EXERCISES: CPT

## 2023-06-20 PROCEDURE — 97164 PT RE-EVAL EST PLAN CARE: CPT

## 2023-06-20 NOTE — PROGRESS NOTES
Daily Note     Today's date: 2023  Patient name: Anthony Ochoa  : 1950  MRN: [id number]  Referring provider: Lucie Thornton DO  Dx:   Encounter Diagnosis     ICD-10-CM    1  Closed nondisplaced spiral fracture of shaft of right humerus, initial encounter  S42 [id number]           Start Time: 1210  Stop Time: 1300  Total time in clinic (min): 50 minutes    Subjective: Pt stated that she has been doing well and while she is going throughout her day she does not have much pain or difficulty but at the end of the day after doing a lot with her RUE she has some pain and muscle soreness  Pt presented with sister to help with translation throughout session  Objective: See treatment diary below    Right Shoulder   Flexion:170 degrees   Abduction: 170 degrees      Left Shoulder:   Flexion: 175 degrees  Abduction: 175 degrees     Passive Range of Motion  Right shoulder  Flexion: 178 degrees  Abduction: 175 degrees  ER: 45 degrees    R Elbow  Flexion: 150 degrees  Extension: 0 degrees     Strength:  Right shoulder  Flexion: 5/5  Abduction: 4+/5  ER: 4+/5 with pain   IR: 5/5      Right Elbow  Flexion: 5/5  Extension: 4+/5      Assessment: Pt was reassessed during session  Pt demonstrated good improvement in R shoulder strength, only showing slightly decreased strength into abduction and ER with mild discomfort during ER but still showed significant improvement since last re evaluation  Pt also showed improvement in R shoulder ROM and is only slightly decreased compared to LUE  Pt showed improved tolerance to R shoulder strengthening exercises during session, still showed slight compensation with R shoulder abduction with increased resistance but performed without discomfort  Discussed with pt that today is the last session that will be getting covered by her insurance, educated pt on possible transition to HEP to see how pt feels about it, pt agreed to trying it   Pt was given updated HEP and resistance bands to use "at home and was educated on proper form during all exercises  Discussed with pt that physical therapist will check MD note about follow up and call to discuss further treatment options if necessary  Goals  ST  Pt will reduce pain to 2/10  Met (3/20)  2  Pt will improve R shoulder flexion PROM to 120 degrees  Met (3/20)  LT  Pt will improve R shoulder flexion AROM to 110 degrees for improved ability to reach overhead  Met (3/20) Modify:     R shoulder flexion AROM to 170 for improved ability to reach overhead  MET   2  Pt will improve R shoulder flex and ABD strength to 4/5 for improved ability to lift  Met   Modify:     abduction to 4+/5  MET ()  3  Pt will be I w HEP  Met   4  Pt will improve R shoulder ER and IR strength to 4+/5 to improve ability to carry groceries ()  MET ()  5  Pt will improve R shoulder ER ROM to WNL to increase tolerance of carrying objects ()  Not Met ()    Plan: Possible discharge with transition to HEP, will read MD note after follow up to see if further formal PT is recommended/necessary  Precautions: R humerus Fx on 22, out of bar[place]ona brace on 2/10/23  (Slovenian speaking only)    MD script (): \"Patient will continue with physical therapy  She may be weightbearing as tolerated to the right upper extremity and is allowed full range of motion  Patient does note some right wrist stiffness, this should be added to her therapy regimen  She should continue therapy until she feels she has maximized her progress  \"    Date 5/1 5/8 5/11 5/15 5/23 5/30 6/6 6/20  4/27   Visit # 21 22 23 24 25 26 27 28  20   FOTO          nv   Re-eval               Manuals 5/1 5/8 5/11 5/15 5/23 5/30 6/6 6/20  4/27   R shoulder PROM DK DK DK       DK   R elbow PROM DK DK        DK                             Neuro Re-Ed 5/1 5/8 5/11 5/15 5/23 5/30 6/6 6/20  4/27   Scap retraction             Gripper      20x blue 20x blue   20x3\" blue   Flexbar for wrist      " "        MCP extension w web      20x yellow 20x yellow   20x yellow   No monies   20x GTB 20x GTB 20x GTB 20x GTB 20x BTB   2x10 GTB   scap punch              Ball circles       30x f/b RMB 30x f/b RMB   30x ea RMB   Rhythmic stabs             Pt Edu  DK DK DK      DK   Ther Ex 5/1 5/8 5/11 5/15 5/23 5/30 6/6 6/20  4/27   Pulleys 3'/3' UBE  3'/3' UBE 3'/3' UBE 3'/3' UBE  3'/3' UBE 3'/3' UBE     Elbow flex/ext PROM             Pendulums             Table slides             SB rolling             Shoulder AAROM             Passive elbow flexion str             Wall slides             Finger ladder 10x10\" abd 10x10\" abd           ER/IR 2x10 3 5#/6 5# 2x10 3 5# ER 2x10 4# ER/IR 2x10 4#  2x10 4#  2x10 5#/7 5# 2x10 5#/7 5#   2x10 3#/6#   S/l ER  2x15 3# 2x10 3# 20x 3# 20x 4# 20x 4# 20x 5# 20x 5# 20x 5#     Rows/exts 20x 15# 20x 15# ea  20x 15# ea 20x 15# ea 20x 15# row 20x 15# row   20x 15#   Shoulder raises  2x10 3# abd nv 2x10 3# flex and abd 2x10 3# flex and abd 2x10 5# flex, 4# abd 2x10 5# flex, 4# abd      ER band walkout             Shoulder flex cones 10x5 1-2 5# 1- 3#, 2-4# 10x5 1-2 5#, 2-3#, 2-4# 10x5 1-2 5#, 2-3#, 2-4# 10x5 2-3#, 2-4#, 1-5# 10x5 2-3#, 2-4#, 1-5# 10x5 2-3#, 2-4#, 1-2 5# 10x5 2-3#, 2-4#, 1-2 5# 10x5 1-2 5# 2-3#, 2-4#  10x5 1-2#, 2-2 5#, 1-3#, 1-4#   Elbow ext      20x 6# stand ata 20x 6# stand ata      Bicep curl      20x 5# RUE 20x 5# RUE      Wall slides             Prone T's, Y's             UBE             Ther Activity                                       Gait Training                                       Modalities             Ice                               "

## 2023-06-26 ENCOUNTER — OFFICE VISIT (OUTPATIENT)
Dept: OBGYN CLINIC | Facility: CLINIC | Age: 73
End: 2023-06-26
Payer: MEDICARE

## 2023-06-26 ENCOUNTER — APPOINTMENT (OUTPATIENT)
Dept: RADIOLOGY | Facility: AMBULARY SURGERY CENTER | Age: 73
End: 2023-06-26
Attending: STUDENT IN AN ORGANIZED HEALTH CARE EDUCATION/TRAINING PROGRAM
Payer: MEDICARE

## 2023-06-26 VITALS
DIASTOLIC BLOOD PRESSURE: 78 MMHG | WEIGHT: 158.95 LBS | SYSTOLIC BLOOD PRESSURE: 138 MMHG | HEIGHT: 62 IN | HEART RATE: 73 BPM | BODY MASS INDEX: 29.25 KG/M2

## 2023-06-26 DIAGNOSIS — S42.344D CLOSED NONDISPLACED SPIRAL FRACTURE OF SHAFT OF RIGHT HUMERUS WITH ROUTINE HEALING, SUBSEQUENT ENCOUNTER: ICD-10-CM

## 2023-06-26 DIAGNOSIS — S42.344D CLOSED NONDISPLACED SPIRAL FRACTURE OF SHAFT OF RIGHT HUMERUS WITH ROUTINE HEALING, SUBSEQUENT ENCOUNTER: Primary | ICD-10-CM

## 2023-06-26 PROCEDURE — 99213 OFFICE O/P EST LOW 20 MIN: CPT | Performed by: STUDENT IN AN ORGANIZED HEALTH CARE EDUCATION/TRAINING PROGRAM

## 2023-06-26 PROCEDURE — 73060 X-RAY EXAM OF HUMERUS: CPT

## 2023-06-26 NOTE — PROGRESS NOTES
Orthopaedics Office Visit -Follow up patient Visit    ASSESSMENT/PLAN:    Assessment:   #1 right spiral midshaft humerus fracture treated nonoperatively with interval fracture healing, date of injury 12/26/2022    Plan:   #1 patient's x-rays were obtained today of the right humerus  The right humerus alignment has been well-maintained with continued increased callus formation with bridging callus formation surrounding the fracture site and continued remodeling  2   P patient will discontinue formal physical therapy at this time  Instructed the patient to continue home exercise program to continue strengthening the upper extremity  3  Patient instructed to continue to take anti-inflammatory medications, Tylenol as needed for pain relief  4  At this time no gross motion is felt at the fracture site  Patient has continued to progress with physical therapy from a functional standpoint, and there has been interval fracture callus formation on subsequent x-rays  6   Patient will follow-up as needed for any issues with the right humerus    To Do Next Visit:  Xray of right humerus     _____________________________________________________  CHIEF COMPLAINT:  Chief Complaint   Patient presents with   • Right Shoulder - Fracture, Follow-up         SUBJECTIVE:  Moraima Go is a 68 y o  female who presents status post a fall over an elevated step which resulted in a right midshaft humerus fracture this  The date of injury was 12/26/2022  Patient had no previous pain in the right upper extremity  No shoulder pain and stiffness  Patient had no elbow pain after the fall  Patient was placed into a Ramos brace which with padded underneath  Patient has been compliant with brace wear however has developed swelling and edema in the right upper extremity in the forearm with some blistering  Patient was seen by family medicine doctor who prescribed her cream for the blistering    Patient denies any shortness of breath, chest pain, numbness, paresthesias  No previous injury to the right upper extremity    Interval history 1/31/2023  Patient presents for follow-up of right spiral fracture of shaft of humerus  DOI 12/26/2022  Patient presents with her  and son, her son helps with translation  He states she is doing well, she has been compliant with physical therapy and the home exercises they provided  She has been compliant with use of [place] brace  They are concerned with the fit of the brace, state it has loosened and is not fitting correctly  Overall she notes pain throughout the upper extremity but states it is well controlled  She denies any numbness or paresthesias  Interval History 2/28/2023  Patient presents today for follow up evaluation of right spiral fracture of shaft of humerus  DOI 12/26/2022  Patient presents with her son who helps with translation  He states she is doing well and seeing improvements  She continues physical therapy but notes stiffness with shoulder range of motion secondary to pain  She has discontinued use of bar[place]ona brace with out issue or complication  Patient main complaint is of shoulder tightness and pain when she is doing overhead movements  She is continue to progress with range of motion exercises at physical therapy  She does not feel any gross motion at the fracture site in the right upper extremity when she is progressing with physical therapy  Her previous blistering that was present with the use of the [place] brace has resolved  No numbness or paresthesias  Interval history 4/11/23:   Patient and son presents today for follow up evaluation of right spiral fracture of shaft of humerus  DOI 12/26/2022  Patient continues to improve in regards to pain and motion, although she does note some stiffness and tightness in the wrist  She has some muscle spasms with therapy but no pain on a daily basis   Per son she has difficulty cutting vegetables but otherwise is able to complete most ADL's without issue  She continues to attend therapy regularly  No paresthesias  Brace was discontinued at last visit  Interval history 6/26/23   Patient is presents today follow up for right spiral midshaft humerus fracture treated nonoperatively with interval fracture healing, date of injury 12/26/2022  She is present with her son in the room today who is translating for her ( Georgian speaking only)  She states she is doing well  She has finished with physical therapy and has been doing home exercises daily  She notes pain in the mid right arm with change of weather  She notes she is having trouble washing and combing her hair with her right arm when trying to abduct and internally rotate the arm otherwise the patient has no pain with range of motion or activities of daily living  She is currently not taking any pain medications  No numbness or paresthesias    No instability at the fracture site      PAST MEDICAL HISTORY:  Past Medical History:   Diagnosis Date   • No known health problems     NO RELEVANT PAST MEDICAL HX       PAST SURGICAL HISTORY:  Past Surgical History:   Procedure Laterality Date   • CHOLECYSTECTOMY     • HERNIA REPAIR         FAMILY HISTORY:  Family History   Problem Relation Age of Onset   • Heart disease Mother         CARDIOVASCULAR DISEASE: COD   • Lung disease Father         COD   • Heart attack Brother         MYOCARDIAL INFARCTION: COD   • Heart disease Brother         CARDIOVASCULAR DISEASE   • Arrhythmia Brother         PACEMAKER   • Arrhythmia Brother         PACEMAKER   • Aortic aneurysm Sister         ABDOMINAL: COD   • Aortic aneurysm Sister         ABDOMINAL   • No Known Problems Maternal Grandmother    • No Known Problems Maternal Grandfather    • No Known Problems Paternal Grandmother    • No Known Problems Paternal Grandfather        SOCIAL HISTORY:  Social History     Tobacco Use   • Smoking status: Former     Types: Cigarettes   • Smokeless tobacco: Current   • Tobacco comments:     TOBACCO USE   Substance Use Topics   • Alcohol use: Not Currently   • Drug use: Not Currently       MEDICATIONS:    Current Outpatient Medications:   •  acetaminophen (TYLENOL) 500 mg tablet, Take 1 tablet (500 mg total) by mouth every 6 (six) hours as needed for moderate pain or mild pain, Disp: 30 tablet, Rfl: 0  •  aspirin (ECOTRIN LOW STRENGTH) 81 mg EC tablet, Take 1 tablet (81 mg total) by mouth daily, Disp: 90 tablet, Rfl: 6  •  b complex vitamins capsule, Take 1 capsule by mouth daily, Disp: 90 capsule, Rfl: 3  •  benzonatate (TESSALON PERLES) 100 mg capsule, Take 1 capsule (100 mg total) by mouth 3 (three) times a day as needed for cough, Disp: 20 capsule, Rfl: 0  •  bisoprolol-hydrochlorothiazide (ZIAC) 5-6 25 MG per tablet, TAKE ONE TABLET BY MOUTH EVERY 24 HOURS, Disp: 90 tablet, Rfl: 2  •  Calcium Carb-Cholecalciferol 600-10 MG-MCG TABS, TAKE 1 TABLET BY MOUTH EVERY 12 HOURS, Disp: 180 tablet, Rfl: 4  •  calcium carbonate-vitamin D (OSCAL-D) 500 mg-200 units per tablet, TAKE ONE TABLET BY MOUTH EVERY 12 HOURS, Disp: 180 tablet, Rfl: 2  •  calcium carbonate-vitamin D (OSCAL-D) 500 mg-200 units per tablet, Take 1 tablet by mouth 2 (two) times a day with meals, Disp: 180 tablet, Rfl: 3  •  calcium-vitamin D (OSCAL) 250-125 MG-UNIT per tablet, Take 1 tablet by mouth daily, Disp: 60 tablet, Rfl: 3  •  cetirizine (ZyrTEC) 10 mg tablet, Take 1 tablet (10 mg total) by mouth daily, Disp: 90 tablet, Rfl: 3  •  Cholecalciferol (Vitamin D3) 50 MCG (2000 UT) capsule, Take 1 capsule (2,000 Units total) by mouth daily, Disp: 90 capsule, Rfl: 10  •  clotrimazole-betamethasone (LOTRISONE) 1-0 05 % cream, APPLY TOPICALLY TO AFFECTED AREA TWICE A DAY, Disp: 60 g, Rfl: 0  •  cyclobenzaprine (FLEXERIL) 5 mg tablet, Take 1 tablet (5 mg total) by mouth 3 (three) times a day as needed for muscle spasms for up to 30 doses, Disp: 30 tablet, Rfl: 0  •  Easy Comfort Lancets MISC, USE TO TEST THE BLOOD SUGAR TWICE A DAY, Disp: 100 each, Rfl: 9  •  fluticasone (FLONASE) 50 mcg/act nasal spray, 1 spray into each nostril daily (Patient taking differently: 1 spray into each nostril daily pt using only as needed), Disp: 1 Bottle, Rfl: 6  •  FREESTYLE LITE test strip, USE TO TEST THE BLOOD SUGAR TWICE A DAY, Disp: 100 strip, Rfl: 11  •  loratadine (CLARITIN) 10 mg tablet, Take 1 tablet (10 mg total) by mouth daily (Patient taking differently: Take 10 mg by mouth daily pt using this only as needed), Disp: 90 tablet, Rfl: 3  •  loratadine-pseudoephedrine (CLARITIN-D 24-HOUR)  mg per 24 hr tablet, Take 1 tablet by mouth daily, Disp: 10 tablet, Rfl: 0  •  magnesium oxide (MAG-OX) 400 mg tablet, Take 1 tablet (400 mg total) by mouth daily, Disp: 90 tablet, Rfl: 3  •  meloxicam (MOBIC) 15 mg tablet, Take 1 tablet (15 mg total) by mouth daily (Patient taking differently: Take 15 mg by mouth daily pt is using this only as needed), Disp: 90 tablet, Rfl: 3  •  methocarbamol (ROBAXIN) 500 mg tablet, Take 1 tablet (500 mg total) by mouth 3 (three) times a day (Patient taking differently: Take 500 mg by mouth 3 (three) times a day pt uses this only as needed), Disp: 30 tablet, Rfl: 0  •  methylPREDNISolone 4 MG tablet therapy pack, Use as directed on package, Disp: 21 each, Rfl: 0  •  naproxen (NAPROSYN) 500 mg tablet, Take 1 tablet (500 mg total) by mouth 2 (two) times a day with meals, Disp: 20 tablet, Rfl: 0  •  terbinafine (LamISIL) 250 mg tablet, Take 1 tablet (250 mg total) by mouth daily, Disp: 90 tablet, Rfl: 0  •  triamcinolone (KENALOG) 0 1 % lotion, APPLY TOPICALLY TO AFFECTED AREA THREE TIMES A DAY, Disp: 60 mL, Rfl: 2  •  UltiCare Alcohol Swabs 70 % PADS, USE AS DIRECTED THREE TIMES A DAY, Disp: 100 each, Rfl: 9  •  Vascepa 1 g CAPS, TAKE TWO CAPSULES BY MOUTH TWICE A DAY, Disp: 360 capsule, Rfl: 10  •  Vitamin D, Ergocalciferol, 50 MCG (2000 UT) CAPS, Take 1 capsule by mouth daily, Disp: 90 capsule, Rfl: "3    ALLERGIES:  Allergies   Allergen Reactions   • No Known Allergies        REVIEW OF SYSTEMS:  MSK: Right upper extremity pain  Neuro: No numbness or paresthesias  Pertinent items are otherwise noted in HPI  A comprehensive review of systems was otherwise negative  LABS:  HgA1c:   Lab Results   Component Value Date    HGBA1C 5 7 (H) 03/16/2023     BMP:   Lab Results   Component Value Date    CALCIUM 8 8 12/26/2022    K 3 5 12/26/2022    CO2 28 12/26/2022     12/26/2022    BUN 25 12/26/2022    CREATININE 0 82 12/26/2022     CBC: No components found for: \"CBC\"    _____________________________________________________  PHYSICAL EXAMINATION:  Vital signs: /78 (BP Location: Right arm, Patient Position: Sitting, Cuff Size: Standard)   Pulse 73   Ht 5' 2\" (1 575 m)   Wt 72 1 kg (158 lb 15 2 oz)   LMP  (LMP Unknown)   BMI 29 07 kg/m²   General: No acute distress, awake and alert  Psychiatric: Mood and affect appear appropriate  HEENT: Trachea Midline, No torticollis, no apparent facial trauma  Cardiovascular: No audible murmurs; Extremities appear perfused  Pulmonary: No audible wheezing or stridor  Skin: No open lesions; see further details (if any) below    MUSCULOSKELETAL EXAMINATION:  Extremities: Right upper extremity was exposed inspected  Skin is intact along the entirety of the right upper extremity  Range of motion of the elbow has improved to allow for full extension and full flexion of the right elbow from 0 to 135 degrees of flexion  She has no gross motion at the fracture site with stress and denies any discomfort  No tenderness over the fracture site  Patient is range of motion of the shoulder has regained back to its baseline with range of motion full and intact comparable to the contralateral side  No gross deformity  Patient's sensation is intact to light touch in the axillary, radial, ulnar, median nerve distributions  AIN, PIN, ulnar nerves intact    +2 radial pulses " distally  Compartments soft compressible      _____________________________________________________  STUDIES REVIEWED:  I personally reviewed the images and interpretation is as follows:  X-rays of the right humerus demonstrated a midshaft spiral humeral shaft with interval fracture healing  There continues to show developing callus formation in the proximal and distal ends of the fracture site  Bridging callus has established itself from the proximal and distal fragments no change in alignment from previous x-ray evaluation      PROCEDURES PERFORMED:  Procedures   No procedures today       Scribe Attestation    I,:  Magno Elizabeth am acting as a scribe while in the presence of the attending physician :       I,:  Dean Mcclure DO personally performed the services described in this documentation    as scribed in my presence :

## 2023-06-27 ENCOUNTER — APPOINTMENT (OUTPATIENT)
Dept: PHYSICAL THERAPY | Facility: CLINIC | Age: 73
End: 2023-06-27
Payer: MEDICARE

## 2023-09-11 ENCOUNTER — EVALUATION (OUTPATIENT)
Dept: PHYSICAL THERAPY | Facility: CLINIC | Age: 73
End: 2023-09-11
Payer: MEDICARE

## 2023-09-11 VITALS — DIASTOLIC BLOOD PRESSURE: 64 MMHG | SYSTOLIC BLOOD PRESSURE: 110 MMHG

## 2023-09-11 DIAGNOSIS — M25.511 BILATERAL SHOULDER PAIN, UNSPECIFIED CHRONICITY: ICD-10-CM

## 2023-09-11 DIAGNOSIS — M25.512 BILATERAL SHOULDER PAIN, UNSPECIFIED CHRONICITY: ICD-10-CM

## 2023-09-11 PROCEDURE — 97161 PT EVAL LOW COMPLEX 20 MIN: CPT

## 2023-09-11 PROCEDURE — 97530 THERAPEUTIC ACTIVITIES: CPT

## 2023-09-11 NOTE — PROGRESS NOTES
PT Evaluation     Today's date: 2023  Patient name: Carlos Snyder  : 1950  MRN: [id number]  Referring provider: Marlena Esquivel MD  Dx:   Encounter Diagnosis     ICD-10-CM    1. Bilateral shoulder pain, unspecified chronicity  M25.511 Ambulatory Referral to Physical Therapy    M25.512           Start Time: 1500          Assessment  Assessment details: Carlos Snyder  is a pleasant 68 y.o. female who presents with B shoulder pain.  The primary movement problem is decreased shoulder PROM/ AROM as well as strength deficits  resulting in decreased tolerance for arm movement, overhead lifting and rotation, which limit her ability to complete ADLs and IADLS due to high severity of pain with motion.  Recommended patient see referring physician prior to beginning therapy due to speed and severity of symptom increase as well as serve bony tenderness over the [facility] joint/ clavicle and ant GH joint. HEP and additional testing held until patient sees referring physician. Significant decline in strength and ROM since previous PT assessment in . Problem List:  1) Decreased shoulder ROM   2) Decreased scapular activation   3) UE strength deficits     Tolerated session without adverse effects. Pt. will benefit from skilled PT services that includes manual therapy techniques to enhance tissue extensibility, neuromuscular re-education to facilitate motor control, therapeutic exercise to increase functional mobility, and modalities prn to reduce pain and inflammation.   Impairments: pain with function, scapular dyskinesis and poor posture     Symptom irritability: moderateUnderstanding of Dx/Px/POC: good   Prognosis: good    Goals  Impairment Goals  - Pt I with initial HEP in 1-2 visits  - Improve ROM equal to contralateral side in 4-6 weeks  - Increase strength to 5/5 in all affected areas in 4-6 weeks    Functional Goals  - Increase Functional Status Measure to [facility] by discharge   - Patient will be independent with comprehensive HEP in 6-8 weeks  - Patient will be able to reach for object from shelf at shoulder height with min reports of difficulty in 2-4 weeks  - Patient will be able to reach for object overhead with min to difficulty in 6-8 weeks  - Patient will be able to lift/carry objects without provocation of symptoms in 6-8 weeks    Plan  Patient would benefit from: PT eval and skilled physical therapy  Referral necessary: Yes  Planned modality interventions: cryotherapy, TENS and thermotherapy: hydrocollator packs  Planned therapy interventions: IASTM, joint mobilization, kinesiology taping, manual therapy, neuromuscular re-education, patient education, postural training, strengthening, stretching, therapeutic activities, therapeutic exercise, flexibility, home exercise program and functional ROM exercises  Frequency: 2x week  Duration in weeks: 8  Plan of Care beginning date: 9/11/2023  Plan of Care expiration date: 11/6/2023  Treatment plan discussed with: patient and family        Subjective Evaluation    History of Present Illness  Mechanism of injury: Pablo Bosch presents with c/c of B shoulder pain. Was previously treated at this facilitate this year for R humerus fracture. Symptoms began again about 1 week after stopping therapy. Reports pain levels fluctuate. Has not seen physician since stopping therapy. Denies additional injury. Denies numbness/ tingling in the hand, decreased  strength  Aggravating factors: reaching behind head and doing hair  Relieving factors: relaxing the arm   24hr pain pattern: 0/10 (current), 0/10 (best), 8/10 (worst), location:back of the arm wrapping around to the front , descriptors: dull   Imaging: none since stopping therapy   Previous treatments: PT  Patient goals: " to get motion better/ decrease pain"           Recurrent probem    Quality of life: good          Objective     Observations     Right Shoulder  Positive for atrophy and edema.      Additional Observation Details  Forward shoulder   Mild edema on top of the shoulder  Increased bicep spasming     Palpation     Additional Palpation Details  TTP throughout the bicep/ pec and tricep with palpable spasms   Notes severe tenderness over the [facility] joint/ clavicle and ant GH joint - advised to contact physician due to severity of tenderness   UT TTP and spasming noted throughout     Cervical/Thoracic Screen   Cervical range of motion within normal limits with the following exceptions: WNL mild restrictions in R UT with L SB     Active Range of Motion   Left Shoulder   Normal active range of motion  External rotation BTH: C4   Internal rotation BTB: T7     Right Shoulder   Flexion: 153 degrees with pain  Abduction: 120 degrees with pain  External rotation BTH: C4   Internal rotation BTB: T7     Additional Active Range of Motion Details  Pulling in tricep with ER  Pain in the upper trap with IR     Passive Range of Motion     Right Shoulder   Flexion: 180 degrees with pain  Abduction: 140 degrees with pain  External rotation 0°: 20 degrees with pain  Internal rotation 0°: 60 degrees     Additional Passive Range of Motion Details  Pain in post arm noted     Strength/Myotome Testing     Left Shoulder     Planes of Motion   Flexion: 4+   Abduction: 4+   External rotation at 0°: 4+   Internal rotation at 0°: 4+     Isolated Muscles   Biceps: 4+   Triceps: 4+     Right Shoulder     Planes of Motion   Flexion: 4   Abduction: 4-   External rotation at 0°: 4-   Internal rotation at 0°: 4     Isolated Muscles   Biceps: 4+   Triceps: 4     Additional Strength Details  MMT based on ROM/ activation pain noted with abduction and rotation activation              Precautions: HTN, PVD,memory change     Manuals 9/11            PROM              Bicep STM                                        Neuro Re-Ed 9/11            ata rows/ extensions              No moneys              Shoulder ER/IR              scap act Ther Ex 9/11            UT stretch              Finger ladder             IR stretch              Supine cane flexion/ ER              Bicep curls                                                     Ther Activity 9/11            UBE             Pulleys              Patient education  KR                         FOTO             Modalities 9/11            Cp prn

## 2023-09-13 ENCOUNTER — HOSPITAL ENCOUNTER (OUTPATIENT)
Dept: MAMMOGRAPHY | Facility: CLINIC | Age: 73
Discharge: HOME | End: 2023-09-13
Payer: MEDICARE

## 2023-09-13 VITALS — WEIGHT: 153 LBS | BODY MASS INDEX: 30.84 KG/M2 | HEIGHT: 59 IN

## 2023-09-13 DIAGNOSIS — Z12.31 OTHER SCREENING MAMMOGRAM: ICD-10-CM

## 2023-09-13 PROCEDURE — 77063 BREAST TOMOSYNTHESIS BI: CPT

## 2023-09-13 PROCEDURE — 77067 SCR MAMMO BI INCL CAD: CPT

## 2023-10-03 ENCOUNTER — EVALUATION (OUTPATIENT)
Dept: PHYSICAL THERAPY | Facility: CLINIC | Age: 73
End: 2023-10-03
Payer: MEDICARE

## 2023-10-03 DIAGNOSIS — S42.294D OTHER CLOSED NONDISPLACED FRACTURE OF PROXIMAL END OF RIGHT HUMERUS WITH ROUTINE HEALING, SUBSEQUENT ENCOUNTER: ICD-10-CM

## 2023-10-03 DIAGNOSIS — M25.611 DECREASED ROM OF RIGHT SHOULDER: Primary | ICD-10-CM

## 2023-10-03 PROCEDURE — 97110 THERAPEUTIC EXERCISES: CPT

## 2023-10-03 PROCEDURE — 97140 MANUAL THERAPY 1/> REGIONS: CPT

## 2023-10-03 PROCEDURE — 97112 NEUROMUSCULAR REEDUCATION: CPT

## 2023-10-03 NOTE — PROGRESS NOTES
Daily Note     Today's date: 10/3/2023  Patient name: Elyse Small  : 1950  MRN: [id number]  Referring provider: Karlene Dee MD  Dx:   Encounter Diagnosis     ICD-10-CM    1. Decreased ROM of right shoulder  M25.611 Ambulatory Referral to Physical Therapy      2. Other closed nondisplaced fracture of proximal end of right humerus with routine healing, subsequent encounter  S42.294D Ambulatory Referral to Physical Therapy          Start Time: 1600  Stop Time: 1645  Total time in clinic (min): 45 minutes    Subjective: Pt reports no change in symptoms in her right shouder since the intial evaluation. Objective: See treatment diary below      Assessment: Pt tolerated treatment well. Pt responded well to manual STM and PROM with reduced tension in musculature and improve ROM post manual interventions. Added rows/ext, no monies, and scap punches to challenge strength and endurance of rotator cuff and scapular stabilization musculature. Pt was properly challenged throughout therapy session presenting with appropriate levels of fatigue post-session. Patient exhibited good technique with therapeutic exercises and would benefit from continued PT      Plan: Continue per plan of care. Progress treatment as tolerated.        Precautions: HTN, PVD,memory change     Manuals 9/11 10/3           PROM   PWK           Bicep STM   PWK                                     Neuro Re-Ed 9/11 10/3           ata rows/ extensions   2x10 7.5 #           No moneys   2x15 PTB           Shoulder ER/IR              scap act              Scap punches  20x PVC                                     Ther Ex 9/11 10/3           UT stretch   3x30" ea           Finger ladder  10x5"           IR stretch   20x 5"           Supine cane flexion/ ER   20x ea PVC           Bicep curls   2x10 2#                                                  Ther Activity 9/11 10/3           UBE  3'/3'           Pulleys              Patient education  KR FOTO             Modalities 9/11            Cp prn

## 2023-10-10 ENCOUNTER — OFFICE VISIT (OUTPATIENT)
Dept: PHYSICAL THERAPY | Facility: CLINIC | Age: 73
End: 2023-10-10
Payer: MEDICARE

## 2023-10-10 DIAGNOSIS — M25.511 BILATERAL SHOULDER PAIN, UNSPECIFIED CHRONICITY: ICD-10-CM

## 2023-10-10 DIAGNOSIS — M25.512 BILATERAL SHOULDER PAIN, UNSPECIFIED CHRONICITY: ICD-10-CM

## 2023-10-10 DIAGNOSIS — S42.294D OTHER CLOSED NONDISPLACED FRACTURE OF PROXIMAL END OF RIGHT HUMERUS WITH ROUTINE HEALING, SUBSEQUENT ENCOUNTER: ICD-10-CM

## 2023-10-10 DIAGNOSIS — M25.611 DECREASED ROM OF RIGHT SHOULDER: Primary | ICD-10-CM

## 2023-10-10 PROCEDURE — 97112 NEUROMUSCULAR REEDUCATION: CPT

## 2023-10-10 PROCEDURE — 97530 THERAPEUTIC ACTIVITIES: CPT

## 2023-10-10 PROCEDURE — 97010 HOT OR COLD PACKS THERAPY: CPT

## 2023-10-10 PROCEDURE — 97110 THERAPEUTIC EXERCISES: CPT

## 2023-10-10 NOTE — PROGRESS NOTES
Daily Note     Today's date: 10/10/2023  Patient name: Cortes Lee  : 1950  MRN: [id number]  Referring provider: Anamika Frazier MD  Dx:   Encounter Diagnosis     ICD-10-CM    1. Decreased ROM of right shoulder  M25.611       2. Other closed nondisplaced fracture of proximal end of right humerus with routine healing, subsequent encounter  S42.294D       3. Bilateral shoulder pain, unspecified chronicity  M25.511     M25.512           Start Time: 1525  Stop Time: 1618  Total time in clinic (min): 53 minutes    Subjective: Pt reports that her shoulder is sore coming into today's session with increased tension in musculature. Objective: See treatment diary below      Assessment: Pt tolerated treatment well. Added resisted IR/ER to continue to challenge rotator cuff, scapular stabilization, and postural musculature strength and endurance. Pt was challenge by exercises and presented with increased fatigue post-exercise. Pt required rest break after performing new exercises, but was able to complete remainder of program with appropriate levels of fatigue. MHP was used at end of therapy session to combat increased tension in shoulder musculature. Patient exhibited good technique with therapeutic exercises and would benefit from continued PT      Plan: Continue per plan of care. Progress treatment as tolerated.        Precautions: HTN, PVD,memory change     Manuals 9/11 10/3 10/10          PROM   PWK           Bicep STM   PWK                                     Neuro Re-Ed 9/11 10/3 10/10          ata rows/ extensions   2x10 7.5 # 3x10 10#          No moneys   2x15 PTB           Shoulder ER/IR    15x ea 7#          scap act              Scap punches  20x PVC           Crossbody Str   3x30"                       Ther Ex 9/11 10/3 10/10          UT stretch   3x30" ea 3x30" ea          Finger ladder  10x5" 10x5" ea          IR stretch   20x 5"           Supine cane flexion/ ER   20x ea PVC           Bicep curls 2x10 2#                                                  Ther Activity 9/11 10/3 10/10          UBE  3'/3' 4'/4'          Pulleys              Patient education  KR                         FOTO             Modalities 9/11  10/10          Cp prn              MHP   10'

## 2023-10-12 ENCOUNTER — OFFICE VISIT (OUTPATIENT)
Dept: PHYSICAL THERAPY | Facility: CLINIC | Age: 73
End: 2023-10-12
Payer: MEDICARE

## 2023-10-12 DIAGNOSIS — M25.611 DECREASED ROM OF RIGHT SHOULDER: Primary | ICD-10-CM

## 2023-10-12 DIAGNOSIS — M25.512 BILATERAL SHOULDER PAIN, UNSPECIFIED CHRONICITY: ICD-10-CM

## 2023-10-12 DIAGNOSIS — S42.294D OTHER CLOSED NONDISPLACED FRACTURE OF PROXIMAL END OF RIGHT HUMERUS WITH ROUTINE HEALING, SUBSEQUENT ENCOUNTER: ICD-10-CM

## 2023-10-12 DIAGNOSIS — M25.511 BILATERAL SHOULDER PAIN, UNSPECIFIED CHRONICITY: ICD-10-CM

## 2023-10-12 PROCEDURE — 97530 THERAPEUTIC ACTIVITIES: CPT

## 2023-10-12 PROCEDURE — 97140 MANUAL THERAPY 1/> REGIONS: CPT

## 2023-10-12 PROCEDURE — 97110 THERAPEUTIC EXERCISES: CPT

## 2023-10-12 NOTE — PROGRESS NOTES
Daily Note     Today's date: 10/12/2023  Patient name: Jhon Piña  : 1950  MRN: [id number]  Referring provider: Edmundo Mao MD  Dx:   Encounter Diagnosis     ICD-10-CM    1. Decreased ROM of right shoulder  M25.611       2. Other closed nondisplaced fracture of proximal end of right humerus with routine healing, subsequent encounter  S42.294D       3. Bilateral shoulder pain, unspecified chronicity  M25.511     M25.512           Start Time: 1500  Stop Time: 1540  Total time in clinic (min): 40 minutes    Subjective: Pt reports that she is so-so today with some discomfort in her shoulder today. Objective: See treatment diary below      Assessment: Pt tolerated treatment well. Added wall walks and chop and lifts to challenge rotator cuff, scapular stabilization, and postural musculature. Pt was challenged by exercises reuqiring rest breaks between sets in order to complete. Pt also requiring moderate cueing from PT for form corrections on new exercises. Once rested and cued, pt was able to complete all sets and reps with proper form and appropriate levels of fatigue. Add wall clocks next visit. Patient exhibited good technique with therapeutic exercises and would benefit from continued PT      Plan: Continue per plan of care. Progress treatment as tolerated.        Precautions: HTN, PVD,memory change     Manuals 9/11 10/3 10/10 10/12         PROM   [address] STM   [address] [facility]  PWK                                   Neuro Re-Ed 9/11 10/3 10/10 10/12         ata rows/ extensions   2x10 7.5 # 3x10 10# 3x10 7.5#         No moneys   2x15 PTB           Shoulder ER/IR    15x ea 7#          scap act              Scap punches  20x PVC           Crossbody Str   3x30"                       Ther Ex 9/11 10/3 10/10 10/12         UT stretch   3x30" ea 3x30" ea          Finger ladder  10x5" 10x5" ea 10x5" ea         IR stretch   20x 5"           Supine cane flexion/ ER   20x ea PVC           Bicep curls 2x10 2#           Wall Clocks    NV         Wall Walks    5 laps PTB         Chop and Lifts    3x10 5#         Ther Activity 9/11 10/3 10/10 10/12         UBE  3'/3' 4'/4' 4'/4'         Pulleys              Patient education  KR                         FOTO             Modalities 9/11  10/10 10/12         Cp prn              MHP   10'

## 2023-10-17 ENCOUNTER — OFFICE VISIT (OUTPATIENT)
Dept: PHYSICAL THERAPY | Facility: CLINIC | Age: 73
End: 2023-10-17
Payer: MEDICARE

## 2023-10-17 ENCOUNTER — APPOINTMENT (OUTPATIENT)
Dept: PHYSICAL THERAPY | Facility: CLINIC | Age: 73
End: 2023-10-17
Payer: MEDICARE

## 2023-10-17 DIAGNOSIS — M25.511 BILATERAL SHOULDER PAIN, UNSPECIFIED CHRONICITY: ICD-10-CM

## 2023-10-17 DIAGNOSIS — M25.512 BILATERAL SHOULDER PAIN, UNSPECIFIED CHRONICITY: ICD-10-CM

## 2023-10-17 DIAGNOSIS — S42.294D OTHER CLOSED NONDISPLACED FRACTURE OF PROXIMAL END OF RIGHT HUMERUS WITH ROUTINE HEALING, SUBSEQUENT ENCOUNTER: ICD-10-CM

## 2023-10-17 DIAGNOSIS — M25.611 DECREASED ROM OF RIGHT SHOULDER: Primary | ICD-10-CM

## 2023-10-17 PROCEDURE — 97110 THERAPEUTIC EXERCISES: CPT

## 2023-10-17 PROCEDURE — 97140 MANUAL THERAPY 1/> REGIONS: CPT

## 2023-10-17 PROCEDURE — 97530 THERAPEUTIC ACTIVITIES: CPT

## 2023-10-17 NOTE — PROGRESS NOTES
Daily Note     Today's date: 10/17/2023  Patient name: Curtis Purdy  : 1950  MRN: [id number]  Referring provider: Darin Paz MD  Dx:   Encounter Diagnosis     ICD-10-CM    1. Decreased ROM of right shoulder  M25.611       2. Bilateral shoulder pain, unspecified chronicity  M25.511     M25.512       3. Other closed nondisplaced fracture of proximal end of right humerus with routine healing, subsequent encounter  S42.294D           Start Time: 1500  Stop Time: 1540  Total time in clinic (min): 40 minutes    Subjective: Pt reports that her shoulder and bicep is sore coming into therapy today. Objective: See treatment diary below      Assessment: Pt tolerated treatment well. Pt responded well to manual STM and PROM with improved ROM, reduced tension in biceps, and decreased pain post manual interventions. Added wall clocks to challenge rotator cuff and scapular stabilization musculature strength and endurance. Pt was challenged by exercises needing a rest break at rep 10 on both sides, but was able to continue and complete remainder of reps, with proper form, after rest break was taken. Patient exhibited good technique with therapeutic exercises and would benefit from continued PT      Plan: Continue per plan of care. Progress treatment as tolerated.        Precautions: HTN, PVD,memory change     Manuals 9/11 10/3 10/10 10/12 10/17        PROM   [address] [facility] STM   [address] [facility]  PWK [facility]                                  Neuro Re-Ed 9/11 10/3 10/10 10/12 10/17        ata rows/ extensions   2x10 7.5 # 3x10 10# 3x10 7.5# 3x10 8#        No moneys   2x15 PTB           Shoulder ER/IR    15x ea 7#          scap act              Scap punches  20x PVC   20x 3# bar        Crossbody Str   3x30"                       Ther Ex 9/11 10/3 10/10 10/12 10/17        UT stretch   3x30" ea 3x30" ea          Finger ladder  10x5" 10x5" ea 10x5" ea 10x5" ea        IR stretch   20x 5"           Supine cane flexion/ ER 20x ea PVC   20x ea 3# bar        Bicep curls   2x10 2#   2x10 3#        Wall Clocks    NV 2x15 PTB        Wall Walks    5 laps PTB 5 laps PTB        Chop and Lifts    3x10 5#         Ther Activity 9/11 10/3 10/10 10/12 10/17        UBE  3'/3' 4'/4' 4'/4' 4'/4'        Pulleys              Patient education  KR            Cones     2-1#  2-2#  1-3#        FOTO             Modalities 9/11  10/10 10/12 10/17        Cp prn              MHP   10'

## 2023-10-19 ENCOUNTER — APPOINTMENT (OUTPATIENT)
Dept: PHYSICAL THERAPY | Facility: CLINIC | Age: 73
End: 2023-10-19
Payer: MEDICARE

## 2023-10-19 ENCOUNTER — OFFICE VISIT (OUTPATIENT)
Dept: PHYSICAL THERAPY | Facility: CLINIC | Age: 73
End: 2023-10-19
Payer: MEDICARE

## 2023-10-19 DIAGNOSIS — M25.511 BILATERAL SHOULDER PAIN, UNSPECIFIED CHRONICITY: ICD-10-CM

## 2023-10-19 DIAGNOSIS — S42.294D OTHER CLOSED NONDISPLACED FRACTURE OF PROXIMAL END OF RIGHT HUMERUS WITH ROUTINE HEALING, SUBSEQUENT ENCOUNTER: ICD-10-CM

## 2023-10-19 DIAGNOSIS — M25.611 DECREASED ROM OF RIGHT SHOULDER: Primary | ICD-10-CM

## 2023-10-19 DIAGNOSIS — M25.512 BILATERAL SHOULDER PAIN, UNSPECIFIED CHRONICITY: ICD-10-CM

## 2023-10-19 PROCEDURE — 97110 THERAPEUTIC EXERCISES: CPT

## 2023-10-19 PROCEDURE — 97140 MANUAL THERAPY 1/> REGIONS: CPT

## 2023-10-19 PROCEDURE — 97112 NEUROMUSCULAR REEDUCATION: CPT

## 2023-10-19 NOTE — PROGRESS NOTES
Daily Note     Today's date: 10/19/2023  Patient name: Curtis Purdy  : 1950  MRN: [id number]  Referring provider: Darin Paz MD  Dx:   Encounter Diagnosis     ICD-10-CM    1. Decreased ROM of right shoulder  M25.611       2. Bilateral shoulder pain, unspecified chronicity  M25.511     M25.512       3. Other closed nondisplaced fracture of proximal end of right humerus with routine healing, subsequent encounter  S42.294D           Start Time: 1030  Stop Time: 1110  Total time in clinic (min): 40 minutes    Subjective: Pt stated that she has some pain in her R shoulder today. Objective: See treatment diary below      Assessment: Pt tolerated warm up on UBE well at beginning of session but had increased fatigue at around 5 minutes of activity. Pt performed functional reaching activity with cones well and would benefit from progression of weight next visit to pt tolerance. Pt also showed slight improvement in shoulder ROM with performance of finger ladder activity. Pt required mild verbal and tactile cues to perform shoulder ER exercise with proper form, pt adapted to instruction well. Pt tolerated manual R shoulder PROM and bicep STM with reported decrease in stiffness post manual treatment. Pt would benefit from continued skilled PT to improve UE strength, mobility, flexibility, scapular stability, and functional ability. Plan: Continue per plan of care. Progress treatment as tolerated.        Precautions: HTN, PVD,memory change     Manuals 9/11 10/3 10/10 10/12 10/17 10/19       PROM   PWK  PWK PWK DK       Bicep STM   [address] [facility]  PWK PWK DK                                 Neuro Re-Ed 9/11 10/3 10/10 10/12 10/17 10/19       ata rows/ extensions   2x10 7.5 # 3x10 10# 3x10 7.5# 3x10 8# nv       No moneys   2x15 PTB    2x15 PTB        Shoulder ER/IR    15x ea 7#   2x10 3# ER       scap act              Scap punches  20x PVC   20x 3# bar 20x 3# bar       Crossbody Str   3x30"                       Ther Ex 9/11 10/3 10/10 10/12 10/17 10/19       UT stretch   3x30" ea 3x30" ea          Finger ladder  10x5" 10x5" ea 10x5" ea 10x5" ea 10x5"        IR stretch   20x 5"           Supine cane flexion/ ER   20x ea PVC   20x ea 3# bar 20x ea 3# bar       Bicep curls   2x10 2#   2x10 3# 2x10 4# ea       Wall Clocks    NV 2x15 PTB nv       Wall Walks    5 laps PTB 5 laps PTB nv       Chop and Lifts    3x10 5#         Ther Activity 9/11 10/3 10/10 10/12 10/17 10/19       UBE  3'/3' 4'/4' 4'/4' 4'/4' 3'/3'       Pulleys              Patient education  KR            Cones     2-1#  2-2#  1-3# 2-1#, 2-2#, 1-3#       FOTO             Modalities 9/11  10/10 10/12 10/17        Cp prn              MHP   10'

## 2023-10-24 ENCOUNTER — OFFICE VISIT (OUTPATIENT)
Dept: PHYSICAL THERAPY | Facility: CLINIC | Age: 73
End: 2023-10-24
Payer: MEDICARE

## 2023-10-24 ENCOUNTER — APPOINTMENT (OUTPATIENT)
Dept: PHYSICAL THERAPY | Facility: CLINIC | Age: 73
End: 2023-10-24
Payer: MEDICARE

## 2023-10-24 DIAGNOSIS — M25.511 BILATERAL SHOULDER PAIN, UNSPECIFIED CHRONICITY: ICD-10-CM

## 2023-10-24 DIAGNOSIS — M25.512 BILATERAL SHOULDER PAIN, UNSPECIFIED CHRONICITY: ICD-10-CM

## 2023-10-24 DIAGNOSIS — M25.611 DECREASED ROM OF RIGHT SHOULDER: Primary | ICD-10-CM

## 2023-10-24 DIAGNOSIS — S42.294D OTHER CLOSED NONDISPLACED FRACTURE OF PROXIMAL END OF RIGHT HUMERUS WITH ROUTINE HEALING, SUBSEQUENT ENCOUNTER: ICD-10-CM

## 2023-10-24 PROCEDURE — 97112 NEUROMUSCULAR REEDUCATION: CPT

## 2023-10-24 PROCEDURE — 97530 THERAPEUTIC ACTIVITIES: CPT

## 2023-10-24 PROCEDURE — 97110 THERAPEUTIC EXERCISES: CPT

## 2023-10-24 NOTE — PROGRESS NOTES
Daily Note     Today's date: 10/24/2023  Patient name: Nevaeh Beckford  : 1950  MRN: [id number]  Referring provider: Vini Randall MD  Dx:   Encounter Diagnosis     ICD-10-CM    1. Decreased ROM of right shoulder  M25.611       2. Other closed nondisplaced fracture of proximal end of right humerus with routine healing, subsequent encounter  S42.294D       3. Bilateral shoulder pain, unspecified chronicity  M25.511     M25.512           Start Time: 1055  Stop Time: 1135  Total time in clinic (min): 40 minutes    Subjective: Pt reports that her some discomfort in her shoulder/bicep region today, but not as intense as it has been in previous sessions. Objective: See treatment diary below      Assessment: Pt tolerated treatment well. Added ball wall rolls with red medicine ball to help improve functional shoulder ROM, strength, and endurance in all directions. Pt tolerated new exercise well, being able to complete with proper form and no exacerbation of symptoms. Pt continues to show improvements in activity tolerance with all UE movements, with less fatigue and improved endurance throughout session. Pt progressed wall clocks and wall walks from PTB to GTB. Patient exhibited good technique with therapeutic exercises and would benefit from continued PT      Plan: Continue per plan of care. Progress treatment as tolerated.        Precautions: HTN, PVD,memory change     Manuals 9/11 10/3 10/10 10/12 10/17 10/19 10/24      PROM   PWK  PWK PWK DK PWK      Bicep STM   PWK  PWK PWK DK [address] [facility]                                Neuro Re-Ed 9/11 10/3 10/10 10/12 10/17 10/19 10/24      ata rows/ extensions   2x10 7.5 # 3x10 10# 3x10 7.5# 3x10 8# nv       No moneys   2x15 PTB    2x15 PTB        Shoulder ER/IR    15x ea 7#   2x10 3# ER       scap act              Scap punches  20x PVC   20x 3# bar 20x 3# bar 20x 3# bar      Crossbody Str   3x30"          Wall Ball Roll       20x red ball U/D, S/S, Cw, Ccw      Ther Ex  10/3 10/10 10/12 10/17 10/19 10/24      UT stretch   3x30" ea 3x30" ea          Finger ladder  10x5" 10x5" ea 10x5" ea 10x5" ea 10x5"        IR stretch   20x 5"           Supine cane flexion/ ER   20x ea PVC   20x ea 3# bar 20x ea 3# bar 20x ea 3# bar      Bicep curls   2x10 2#   2x10 3# 2x10 4# ea       Wall Clocks    NV 2x15 PTB nv 2x15 GTB      Wall Walks    5 laps PTB 5 laps PTB nv 2x15 GTB      Chop and Lifts    3x10 5#         Ther Activity 9/11 10/3 10/10 10/12 10/17 10/19 10/24      UBE  3'/3' 4'/4' 4'/4' 4'/4' 3'/3' 3'/3'      Pulleys              Patient education  KR            Cones     2-1#  2-2#  1-3# 2-1#, 2-2#, 1-3# 2-1#, 2-2#, 1-3#      FOTO             Modalities 9/11  10/10 10/12 10/17  10/24      Cp prn              MHP   10' Graham, Emmanuel Alfaro Graham, Ton Noonan, Cristina Lainez

## 2023-10-26 ENCOUNTER — APPOINTMENT (OUTPATIENT)
Dept: PHYSICAL THERAPY | Facility: CLINIC | Age: 73
End: 2023-10-26
Payer: MEDICARE

## 2023-10-26 ENCOUNTER — EVALUATION (OUTPATIENT)
Dept: PHYSICAL THERAPY | Facility: CLINIC | Age: 73
End: 2023-10-26
Payer: MEDICARE

## 2023-10-26 DIAGNOSIS — S42.294D OTHER CLOSED NONDISPLACED FRACTURE OF PROXIMAL END OF RIGHT HUMERUS WITH ROUTINE HEALING, SUBSEQUENT ENCOUNTER: ICD-10-CM

## 2023-10-26 DIAGNOSIS — M25.511 BILATERAL SHOULDER PAIN, UNSPECIFIED CHRONICITY: ICD-10-CM

## 2023-10-26 DIAGNOSIS — M25.611 DECREASED ROM OF RIGHT SHOULDER: Primary | ICD-10-CM

## 2023-10-26 DIAGNOSIS — M25.512 BILATERAL SHOULDER PAIN, UNSPECIFIED CHRONICITY: ICD-10-CM

## 2023-10-26 PROCEDURE — 97110 THERAPEUTIC EXERCISES: CPT

## 2023-10-26 PROCEDURE — 97140 MANUAL THERAPY 1/> REGIONS: CPT

## 2023-10-26 PROCEDURE — 97164 PT RE-EVAL EST PLAN CARE: CPT

## 2023-10-26 NOTE — PROGRESS NOTES
Re-evaluation    Today's date: 10/26/2023  Patient name: Sims Cockayne  : 1950  MRN: [id number]  Referring provider: Thi Leiva MD  Dx:   Encounter Diagnosis     ICD-10-CM    1. Decreased ROM of right shoulder  M25.611       2. Other closed nondisplaced fracture of proximal end of right humerus with routine healing, subsequent encounter  S42.294D       3. Bilateral shoulder pain, unspecified chronicity  M25.511     M25.512           Start Time: 1630  Stop Time: 1710  Total time in clinic (min): 40 minutes    Subjective: Pt stated that she is doing ok, but continues to have pain and tightness in her bicep and shoulder with overhead motions and lifting. Pt family member present to assist with translation throughout session. Objective: See treatment diary below    Active Range of Motion   Right Shoulder   Flexion: 172 degrees with pain at end range  Abduction: 123 degrees with pain      Strength/Myotome Testing     Left Shoulder      Planes of Motion   Flexion: 5   Abduction: 5   External rotation at 0°: 5   Internal rotation at 0°: 4+      Isolated Muscles   Biceps: 4+   Triceps: 4+     Right Shoulder      Planes of Motion   Flexion: 4+ (painful)  Abduction: 4+ (painful)  External rotation at 0°: 4   Internal rotation at 0°: 4- ( painful)     Isolated Muscles   Biceps: 4+   Triceps: 4+       Assessment: Pt was reassessed today during session. Pt demonstrates poor improvement in R shoulder abduction ROM with pain at fx site, but shows good improvement of R shoulder flexion AROM. Pt also showed improvement in R shoulder flexion and abduction strength but continues to have pain with resisted flexion activities. Pt continues to have significant tightness of R bicep with tenderness to palpation, continue to work on manual STM techniques and stretching to relieve tightness and pain.  Pt would benefit from continued formal PT sessions due to difficulty performing exercises at home due to motivation and not understanding some form with activity, pt family member relayed information to pt. Pt tolerated manual R bicep STM and R shoulder PROM with reported decrease in stiffness post manual treatment. Pt tolerated performance of R shoulder ER/IR activity and would benefit from continued performance. Pt was educated on self mobility activities to improve R shoulder function at home, pt had difficulty understanding information. Pt would benefit from continued skilled PT to improve R shoulder ROM, mobility, strength, biceps flexibility, and functional ability. Plan: Continue per plan of care. Progress treatment as tolerated.        Precautions: HTN, PVD,memory change     Manuals 9/11 10/3 10/10 10/12 10/17 10/19 10/24 10/26     PROM   PWK  PWK PWK DK PWK DK     Bicep STM   PWK  PWK PWK DK [facility]                               Neuro Re-Ed 9/11 10/3 10/10 10/12 10/17 10/19 10/24 1036     ata rows/ extensions   2x10 7.5 # 3x10 10# 3x10 7.5# 3x10 8# nv       No moneys   2x15 PTB    2x15 PTB        Shoulder ER/IR    15x ea 7#   2x10 3# ER  2x10 4# ER/IR     scap act              Scap punches  20x PVC   20x 3# bar 20x 3# bar 20x 3# bar      Crossbody Str   3x30"          Wall Ball Roll       20x red ball U/D, S/S, Cw, Ccw      Ther Ex 9/11 10/3 10/10 10/12 10/17 10/19 10/24      UT stretch   3x30" ea 3x30" ea          Finger ladder  10x5" 10x5" ea 10x5" ea 10x5" ea 10x5"        Standing cane abd AAROM        10x10"     IR stretch   20x 5"           Supine cane flexion/ ER   20x ea PVC   20x ea 3# bar 20x ea 3# bar 20x ea 3# bar      Bicep curls   2x10 2#   2x10 3# 2x10 4# ea       Wall Clocks    NV 2x15 PTB nv 2x15 GTB      Wall Walks    5 laps PTB 5 laps PTB nv 2x15 GTB      Chop and Lifts    3x10 5#         Ther Activity 9/11 10/3 10/10 10/12 10/17 10/19 10/24 10/26     UBE  3'/3' 4'/4' 4'/4' 4'/4' 3'/3' 3'/3' 3'/3'     Abbis              Patient education  KELLI Galvez     2-1#  2-2#  1-3# 2-1#, 2-2#, 1-3# 2-1#, 2-2#, 1-3#      FOTO             Modalities 9/11  10/10 10/12 10/17  10/24      Cp prn              P   10'

## 2023-11-01 ENCOUNTER — LAB (OUTPATIENT)
Dept: LAB | Facility: HOSPITAL | Age: 73
End: 2023-11-01
Payer: MEDICARE

## 2023-11-01 DIAGNOSIS — R73.9 HYPERGLYCEMIA: ICD-10-CM

## 2023-11-01 DIAGNOSIS — E78.2 MIXED HYPERLIPIDEMIA: ICD-10-CM

## 2023-11-01 DIAGNOSIS — I15.9 SECONDARY HYPERTENSION: ICD-10-CM

## 2023-11-01 LAB
ALBUMIN SERPL BCP-MCNC: 4.1 G/DL (ref 3.5–5)
ALP SERPL-CCNC: 54 U/L (ref 34–104)
ALT SERPL W P-5'-P-CCNC: 18 U/L (ref 7–52)
ANION GAP SERPL CALCULATED.3IONS-SCNC: 8 MMOL/L
AST SERPL W P-5'-P-CCNC: 20 U/L (ref 13–39)
BASOPHILS # BLD AUTO: 0.06 THOUSANDS/ÂΜL (ref 0–0.1)
BASOPHILS NFR BLD AUTO: 1 % (ref 0–1)
BILIRUB SERPL-MCNC: 1.31 MG/DL (ref 0.2–1)
BUN SERPL-MCNC: 15 MG/DL (ref 5–25)
CALCIUM SERPL-MCNC: 9.3 MG/DL (ref 8.4–10.2)
CHLORIDE SERPL-SCNC: 106 MMOL/L (ref 96–108)
CHOLEST SERPL-MCNC: 198 MG/DL
CK SERPL-CCNC: 148 U/L (ref 26–192)
CO2 SERPL-SCNC: 27 MMOL/L (ref 21–32)
CREAT SERPL-MCNC: 0.56 MG/DL (ref 0.6–1.3)
EOSINOPHIL # BLD AUTO: 0.17 THOUSAND/ÂΜL (ref 0–0.61)
EOSINOPHIL NFR BLD AUTO: 3 % (ref 0–6)
ERYTHROCYTE [DISTWIDTH] IN BLOOD BY AUTOMATED COUNT: 13.5 % (ref 11.6–15.1)
EST. AVERAGE GLUCOSE BLD GHB EST-MCNC: 117 MG/DL
GFR SERPL CREATININE-BSD FRML MDRD: 93 ML/MIN/1.73SQ M
GLUCOSE P FAST SERPL-MCNC: 104 MG/DL (ref 65–99)
HBA1C MFR BLD: 5.7 %
HCT VFR BLD AUTO: 41.4 % (ref 34.8–46.1)
HDLC SERPL-MCNC: 59 MG/DL
HGB BLD-MCNC: 13.4 G/DL (ref 11.5–15.4)
IMM GRANULOCYTES # BLD AUTO: 0.02 THOUSAND/UL (ref 0–0.2)
IMM GRANULOCYTES NFR BLD AUTO: 0 % (ref 0–2)
LDLC SERPL CALC-MCNC: 120 MG/DL (ref 0–100)
LYMPHOCYTES # BLD AUTO: 2.08 THOUSANDS/ÂΜL (ref 0.6–4.47)
LYMPHOCYTES NFR BLD AUTO: 32 % (ref 14–44)
MCH RBC QN AUTO: 28.8 PG (ref 26.8–34.3)
MCHC RBC AUTO-ENTMCNC: 32.4 G/DL (ref 31.4–37.4)
MCV RBC AUTO: 89 FL (ref 82–98)
MONOCYTES # BLD AUTO: 0.33 THOUSAND/ÂΜL (ref 0.17–1.22)
MONOCYTES NFR BLD AUTO: 5 % (ref 4–12)
NEUTROPHILS # BLD AUTO: 3.95 THOUSANDS/ÂΜL (ref 1.85–7.62)
NEUTS SEG NFR BLD AUTO: 59 % (ref 43–75)
NONHDLC SERPL-MCNC: 139 MG/DL
NRBC BLD AUTO-RTO: 0 /100 WBCS
PLATELET # BLD AUTO: 242 THOUSANDS/UL (ref 149–390)
PMV BLD AUTO: 10 FL (ref 8.9–12.7)
POTASSIUM SERPL-SCNC: 4.4 MMOL/L (ref 3.5–5.3)
PROT SERPL-MCNC: 6.6 G/DL (ref 6.4–8.4)
RBC # BLD AUTO: 4.66 MILLION/UL (ref 3.81–5.12)
SODIUM SERPL-SCNC: 141 MMOL/L (ref 135–147)
TRIGL SERPL-MCNC: 94 MG/DL
WBC # BLD AUTO: 6.61 THOUSAND/UL (ref 4.31–10.16)

## 2023-11-01 PROCEDURE — 83036 HEMOGLOBIN GLYCOSYLATED A1C: CPT

## 2023-11-01 PROCEDURE — 82550 ASSAY OF CK (CPK): CPT

## 2023-11-01 PROCEDURE — 80061 LIPID PANEL: CPT

## 2023-11-01 PROCEDURE — 80053 COMPREHEN METABOLIC PANEL: CPT

## 2023-11-01 PROCEDURE — 85025 COMPLETE CBC W/AUTO DIFF WBC: CPT

## 2023-11-01 PROCEDURE — 36415 COLL VENOUS BLD VENIPUNCTURE: CPT

## 2023-11-02 ENCOUNTER — OFFICE VISIT (OUTPATIENT)
Dept: PHYSICAL THERAPY | Facility: CLINIC | Age: 73
End: 2023-11-02
Payer: MEDICARE

## 2023-11-02 DIAGNOSIS — S42.294D OTHER CLOSED NONDISPLACED FRACTURE OF PROXIMAL END OF RIGHT HUMERUS WITH ROUTINE HEALING, SUBSEQUENT ENCOUNTER: ICD-10-CM

## 2023-11-02 DIAGNOSIS — M25.611 DECREASED ROM OF RIGHT SHOULDER: Primary | ICD-10-CM

## 2023-11-02 DIAGNOSIS — M25.512 BILATERAL SHOULDER PAIN, UNSPECIFIED CHRONICITY: ICD-10-CM

## 2023-11-02 DIAGNOSIS — M25.511 BILATERAL SHOULDER PAIN, UNSPECIFIED CHRONICITY: ICD-10-CM

## 2023-11-02 PROCEDURE — 97112 NEUROMUSCULAR REEDUCATION: CPT

## 2023-11-02 PROCEDURE — 97140 MANUAL THERAPY 1/> REGIONS: CPT

## 2023-11-02 PROCEDURE — 97110 THERAPEUTIC EXERCISES: CPT

## 2023-11-02 NOTE — PROGRESS NOTES
Daily Note     Today's date: 2023  Patient name: Malorie Ward  : 1950  MRN: [id number]  Referring provider: Farideh Carballo MD  Dx: No diagnosis found. Subjective: ***      Objective: See treatment diary below      Assessment: Tolerated treatment {Tolerated treatment :5687393272}.  Patient {assessment:8807066968}      Plan: {PLAN:1880224311}     Precautions: HTN, PVD,memory change     Manuals 9/11 10/3 10/10 10/12 10/17 10/19 10/24 10/26 11/2    PROM   PWK  PWK PWK DK PWK DK PWK    Bicep STM   PWK  PWK PWK DK PWK DK PWK                              Neuro Re-Ed 9/11 10/3 10/10 10/12 10/17 10/19 10/24 1036     ata rows/ extensions   2x10 7.5 # 3x10 10# 3x10 7.5# 3x10 8# nv       No moneys   2x15 PTB    2x15 PTB        Shoulder ER/IR    15x ea 7#   2x10 3# ER  2x10 4# ER/IR     scap act              Scap punches  20x PVC   20x 3# bar 20x 3# bar 20x 3# bar  20x 4# bar    Crossbody Str   3x30"          Wall Ball Roll       20x red ball U/D, S/S, Cw, Ccw      Ther Ex 9/11 10/3 10/10 10/12 10/17 10/19 10/24  11/2    UT stretch   3x30" ea 3x30" ea          Finger ladder  10x5" 10x5" ea 10x5" ea 10x5" ea 10x5"        Standing cane abd AAROM        10x10"     IR stretch   20x 5"           Supine cane flexion/ ER   20x ea PVC   20x ea 3# bar 20x ea 3# bar 20x ea 3# bar  20x ea 4# bar    Bicep curls   2x10 2#   2x10 3# 2x10 4# ea       Wall Clocks    NV 2x15 PTB nv 2x15 GTB      Wall Walks    5 laps PTB 5 laps PTB nv 2x15 GTB      Chest Press         2x10 4# bar    Chop and Lifts    3x10 5#         Ther Activity 9/11 10/3 10/10 10/12 10/17 10/19 10/24 10/26     UBE  3'/3' 4'/4' 4'/4' 4'/4' 3'/3' 3'/3' 3'/3' 4'/4'    Timothy              Patient education  KELLI GALLEGO     Cones     2-1#  2-2#  1-3# 2-1#, 2-2#, 1-3# 2-1#, 2-2#, 1-3#      FOTO             Modalities 9/11  10/10 10/12 10/17  10/24      Cp prn              [facility]   10'

## 2023-11-02 NOTE — PROGRESS NOTES
Daily Note     Today's date: 2023  Patient name: Malorie Ward  : 1950  MRN: [id number]  Referring provider: Farideh Carballo MD  Dx:   Encounter Diagnosis     ICD-10-CM    1. Decreased ROM of right shoulder  M25.611       2. Other closed nondisplaced fracture of proximal end of right humerus with routine healing, subsequent encounter  S42.294D       3. Bilateral shoulder pain, unspecified chronicity  M25.511     M25.512           Start Time: 09  Stop Time: 1010  Total time in clinic (min): 40 minutes    Subjective: Pt reports that she is "so-so" coming into today's therapy session with pain noted in her upper arm and shoulder. Objective: See treatment diary below      Assessment: Pt tolerated treatment well. Added chest press during today's session to challenge chest and shoulder musculature strength and endurance. Progressed both scap punches and shoulder AAROM from 3# bar to 4# bar. Pt was challenges by both new exercises and progressions, requiring cueing from PT for but corrections, but was able to complete all sets and reps with proper from and appropriate levels of fatigue following cueing. Patient exhibited good technique with therapeutic exercises and would benefit from continued PT      Plan: Continue per plan of care. Progress treatment as tolerated.        Precautions: HTN, PVD,memory change     Manuals 9/11 10/3 10/10 10/12 10/17 10/19 10/24 10/26 11/2    PROM   PWK  PWK PWK DK PWK DK PWK    Bicep STM   PWK  PWK PWK DK PWK DK [facility]                              Neuro Re-Ed 9/11 10/3 10/10 10/12 10/17 10/19 10/24 1036 11    ata rows/ extensions   2x10 7.5 # 3x10 10# 3x10 7.5# 3x10 8# nv       No moneys   2x15 PTB    2x15 PTB        Shoulder ER/IR    15x ea 7#   2x10 3# ER  2x10 4# ER/IR     scap act              Scap punches  20x PVC   20x 3# bar 20x 3# bar 20x 3# bar  20x 4# bar    Crossbody Str   3x30"          Wall Ball Roll       20x red ball U/D, S/S, Cw, Ccw  20x red ball U/D, S/S, Cw, Ccw    Ther Ex 9/11 10/3 10/10 10/12 10/17 10/19 10/24  11/2    UT stretch   3x30" ea 3x30" ea          Finger ladder  10x5" 10x5" ea 10x5" ea 10x5" ea 10x5"    10x10"    Standing cane abd AAROM        10x10" 10x10"    IR stretch   20x 5"           Supine cane flexion/ ER   20x ea PVC   20x ea 3# bar 20x ea 3# bar 20x ea 3# bar  20x ea 4# bar    Bicep curls   2x10 2#   2x10 3# 2x10 4# ea       Wall Clocks    NV 2x15 PTB nv 2x15 GTB  2x15 GTB    Wall Walks    5 laps PTB 5 laps PTB nv 2x15 GTB  5 laps GBT    Chest Press         2x10 4# bar    Chop and Lifts    3x10 5#         Ther Activity 9/11 10/3 10/10 10/12 10/17 10/19 10/24 10/26 11/2    UBE  3'/3' 4'/4' 4'/4' 4'/4' 3'/3' 3'/3' 3'/3' 4'/4'    Pulleys              Patient education  KR       DK     Cones     2-1#  2-2#  1-3# 2-1#, 2-2#, 1-3# 2-1#, 2-2#, 1-3#      FOTO             Modalities 9/11  10/10 10/12 10/17  10/24      Cp prn              [facility]   10'

## 2023-11-06 ENCOUNTER — OFFICE VISIT (OUTPATIENT)
Dept: PHYSICAL THERAPY | Facility: CLINIC | Age: 73
End: 2023-11-06
Payer: MEDICARE

## 2023-11-06 DIAGNOSIS — M25.611 DECREASED ROM OF RIGHT SHOULDER: Primary | ICD-10-CM

## 2023-11-06 DIAGNOSIS — M25.511 BILATERAL SHOULDER PAIN, UNSPECIFIED CHRONICITY: ICD-10-CM

## 2023-11-06 DIAGNOSIS — M25.512 BILATERAL SHOULDER PAIN, UNSPECIFIED CHRONICITY: ICD-10-CM

## 2023-11-06 DIAGNOSIS — S42.294D OTHER CLOSED NONDISPLACED FRACTURE OF PROXIMAL END OF RIGHT HUMERUS WITH ROUTINE HEALING, SUBSEQUENT ENCOUNTER: ICD-10-CM

## 2023-11-06 PROCEDURE — 97112 NEUROMUSCULAR REEDUCATION: CPT

## 2023-11-06 PROCEDURE — 97140 MANUAL THERAPY 1/> REGIONS: CPT

## 2023-11-06 PROCEDURE — 97530 THERAPEUTIC ACTIVITIES: CPT

## 2023-11-06 PROCEDURE — 97110 THERAPEUTIC EXERCISES: CPT

## 2023-11-06 NOTE — PROGRESS NOTES
Daily Note     Today's date: 2023  Patient name: Mirella Cerda  : 1950  MRN: [id number]  Referring provider: Zelda Brewer MD  Dx:   Encounter Diagnosis     ICD-10-CM    1. Decreased ROM of right shoulder  M25.611       2. Other closed nondisplaced fracture of proximal end of right humerus with routine healing, subsequent encounter  S42.294D       3. Bilateral shoulder pain, unspecified chronicity  M25.511     M25.512           Start Time: 1105  Stop Time: 1200  Total time in clinic (min): 55 minutes    Subjective: Pt reports that she is feeling good overall today. Pt states there is a lot of pain and stiffness in the R bicep. Objective: See treatment diary below      Assessment: Pt tolerated treatment well. Pt tolerated exercises well with no adverse symptoms. Pt shows moderate soft tissue restrictions in R bicep and deltoid. Patient exhibited good technique with therapeutic exercises and would benefit from continued PT. Continue to progress as able. Plan: Continue per plan of care. Progress treatment as tolerated.        Precautions: HTN, PVD,memory change     Manuals 9/11 10/3 10/10 10/12 10/17 10/19 10/24 10/26 11/2 11/6   PROM   PWK  PWK PWK DK PWK DK PWK HY   Bicep STM   PWK  PWK PWK DK PWK DK [facility]                             Neuro Re-Ed 9/11 10/3 10/10 10/12 10/17 10/19 10/24 1036    ata rows/ extensions   2x10 7.5 # 3x10 10# 3x10 7.5# 3x10 8# nv    2x10 10#   No moneys   2x15 PTB    2x15 PTB        Shoulder ER/IR    15x ea 7#   2x10 3# ER  2x10 4# ER/IR  2x10 4# ER/IR   scap act              Scap punches  20x PVC   20x 3# bar 20x 3# bar 20x 3# bar  20x 4# bar 2x10 4# bar   Crossbody Str   3x30"          Wall Ball Roll       20x red ball U/D, S/S, Cw, Ccw  20x red ball U/D, S/S, Cw, Ccw 20x red ball U/D, S/S, Cw, Ccw   Ther Ex 9/11 10/3 10/10 10/12 10/17 10/19 10/24  11/2 11/6   UT stretch   3x30" cherie 3x30" cherie          Finger ladder  10x5" 10x5" cherie 10x5" cherie 10x5" cherie 10x5" 10x10" 10x10"   Standing cane abd AAROM        10x10" 10x10" 10x10"   IR stretch   20x 5"           Supine cane flexion/ ER   20x ea PVC   20x ea 3# bar 20x ea 3# bar 20x ea 3# bar  20x ea 4# bar 2x10 4# bar   Bicep curls   2x10 2#   2x10 3# 2x10 4# ea       Wall Clocks    NV 2x15 PTB nv 2x15 GTB  2x15 GTB 2x15 GTB   Wall Walks    5 laps PTB 5 laps PTB nv 2x15 GTB  5 laps GBT 5 laps GTB   Chest Press         2x10 4# bar 2x10 4# bar   Chop and Lifts    3x10 5#         Ther Activity 9/11 10/3 10/10 10/12 10/17 10/19 10/24 10/26 11/2 11/6   UBE  3'/3' 4'/4' 4'/4' 4'/4' 3'/3' 3'/3' 3'/3' 4'/4' 4'/4'   Pulleys              Patient education  KR       DK     Cones     2-1#  2-2#  1-3# 2-1#, 2-2#, 1-3# 2-1#, 2-2#, 1-3#      FOTO             Modalities 9/11  10/10 10/12 10/17  10/24      Cp prn              [facility]   10'

## 2023-11-09 ENCOUNTER — OFFICE VISIT (OUTPATIENT)
Dept: PHYSICAL THERAPY | Facility: CLINIC | Age: 73
End: 2023-11-09
Payer: MEDICARE

## 2023-11-09 ENCOUNTER — TELEPHONE (OUTPATIENT)
Age: 73
End: 2023-11-09

## 2023-11-09 DIAGNOSIS — M25.512 BILATERAL SHOULDER PAIN, UNSPECIFIED CHRONICITY: ICD-10-CM

## 2023-11-09 DIAGNOSIS — S42.294D OTHER CLOSED NONDISPLACED FRACTURE OF PROXIMAL END OF RIGHT HUMERUS WITH ROUTINE HEALING, SUBSEQUENT ENCOUNTER: ICD-10-CM

## 2023-11-09 DIAGNOSIS — M25.611 DECREASED ROM OF RIGHT SHOULDER: Primary | ICD-10-CM

## 2023-11-09 DIAGNOSIS — M25.511 BILATERAL SHOULDER PAIN, UNSPECIFIED CHRONICITY: ICD-10-CM

## 2023-11-09 PROCEDURE — 97140 MANUAL THERAPY 1/> REGIONS: CPT

## 2023-11-09 PROCEDURE — 97110 THERAPEUTIC EXERCISES: CPT

## 2023-11-09 PROCEDURE — 97112 NEUROMUSCULAR REEDUCATION: CPT

## 2023-11-09 NOTE — PROGRESS NOTES
Daily Note     Today's date: 2023  Patient name: Prince Pagan  : 1950  MRN: [id number]  Referring provider: Mira Abernathy MD  Dx:   Encounter Diagnosis     ICD-10-CM    1. Decreased ROM of right shoulder  M25.611       2. Other closed nondisplaced fracture of proximal end of right humerus with routine healing, subsequent encounter  S42.294D       3. Bilateral shoulder pain, unspecified chronicity  M25.511     M25.512           Start Time: 1400  Stop Time: 1148  Total time in clinic (min): 45 minutes    Subjective: Pt stated that she is doing well today with no new complaints of pain. Objective: See treatment diary below      Assessment: Pt tolerated warm up on UBE well at beginning of session without increase in discomfort during or after activity. Pt is challenged appropriately with scap clock activity with mild soreness and fatigue post performance. Pt required mild verbal and tactile cueing to perform resisted ER and IR activity with proper form, pt adapted to instruction well. Pt performed R shoulder mobility and stretching activities well with proper form. Pt required demonstration to perform supine scap and R shoulder strengthening activities with proper form. Pt tolerated manual R shoulder PROM and biceps STM with reported decrease in stiffness post manual treatment. Pt would benefit from continued skilled PT to improve R shoulder strength, mobility, ROM, flexibility, and functional ability. Plan: Continue per plan of care. Progress treatment as tolerated.        Precautions: HTN, PVD,memory change     Manuals 11/9   10/12 10/17 10/19 10/24 10/26 11/2 11/6   PROM  DK   PWK PWK DK PWK DK PWK HY   Bicep STM  DK   PWK PWK DK PWK DK PWK HY                             Neuro Re-Ed 11/9   10/12 10/17 10/19 10/24 1036    ata rows/ extensions  2x10 12#   3x10 7.5# 3x10 8# nv    2x10 10#   No moneys       2x15 PTB        Shoulder ER/IR  2x10 4# ER/IR     2x10 3# ER  2x10 4# ER/IR 2x10 4# ER/IR   scap act              Scap punches 2x10 4# bar    20x 3# bar 20x 3# bar 20x 3# bar  20x 4# bar 2x10 4# bar   Crossbody Str             Wall Ball Roll 20x red ball U/D, S/S, cw, ccw      20x red ball U/D, S/S, Cw, Ccw  20x red ball U/D, S/S, Cw, Ccw 20x red ball U/D, S/S, Cw, Ccw   Ther Ex 11/9   10/12 10/17 10/19 10/24  11/2 11/6   UT stretch              Finger ladder 10x10" flex   10x5" ea 10x5" ea 10x5"    10x10" 10x10"   Standing cane abd AAROM 10x10"        10x10" 10x10" 10x10"   IR stretch              Supine cane flexion/ ER  2x10 4# bar    20x ea 3# bar 20x ea 3# bar 20x ea 3# bar  20x ea 4# bar 2x10 4# bar   Bicep curls      2x10 3# 2x10 4# ea       Wall Clocks 2x15 GTB   NV 2x15 PTB nv 2x15 GTB  2x15 GTB 2x15 GTB   Wall Walks nv   5 laps PTB 5 laps PTB nv 2x15 GTB  5 laps GBT 5 laps GTB   Chest Press 2x10 4# bar        2x10 4# bar 2x10 4# bar   Chop and Lifts    3x10 5#         Ther Activity 11/9   10/12 10/17 10/19 10/24 10/26 11/2 11/6   UBE 3'/3'   4'/4' 4'/4' 3'/3' 3'/3' 3'/3' 4'/4' 4'/4'   Pulleys              Patient education         DK     Cones     2-1#  2-2#  1-3# 2-1#, 2-2#, 1-3# 2-1#, 2-2#, 1-3#      FOTO             Modalities    10/12 10/17  10/24      Cp prn              [facility]

## 2023-11-09 NOTE — TELEPHONE ENCOUNTER
Caller: Elenor Goldmann    Doctor: Courtney Servin    Reason for call: Called to verify that patient attended appt on 4/11/23.     Call back#: n/a

## 2023-11-13 ENCOUNTER — OFFICE VISIT (OUTPATIENT)
Dept: PHYSICAL THERAPY | Facility: CLINIC | Age: 73
End: 2023-11-13
Payer: MEDICARE

## 2023-11-13 DIAGNOSIS — M25.511 BILATERAL SHOULDER PAIN, UNSPECIFIED CHRONICITY: ICD-10-CM

## 2023-11-13 DIAGNOSIS — S42.294D OTHER CLOSED NONDISPLACED FRACTURE OF PROXIMAL END OF RIGHT HUMERUS WITH ROUTINE HEALING, SUBSEQUENT ENCOUNTER: ICD-10-CM

## 2023-11-13 DIAGNOSIS — M25.512 BILATERAL SHOULDER PAIN, UNSPECIFIED CHRONICITY: ICD-10-CM

## 2023-11-13 DIAGNOSIS — M25.611 DECREASED ROM OF RIGHT SHOULDER: Primary | ICD-10-CM

## 2023-11-13 PROCEDURE — 97140 MANUAL THERAPY 1/> REGIONS: CPT

## 2023-11-13 PROCEDURE — 97110 THERAPEUTIC EXERCISES: CPT

## 2023-11-13 NOTE — PROGRESS NOTES
Daily Note     Today's date: 2023  Patient name: Jose Rafael Crooks  : 1950  MRN: [id number]  Referring provider: Jamar Obrien MD  Dx:   Encounter Diagnosis     ICD-10-CM    1. Decreased ROM of right shoulder  M25.611       2. Other closed nondisplaced fracture of proximal end of right humerus with routine healing, subsequent encounter  S42.294D       3. Bilateral shoulder pain, unspecified chronicity  M25.511     M25.512           Start Time:   Stop Time:   Total time in clinic (min): 40 minutes    Subjective: Pt stated that she is doing well today with no new complaints of pain. Pt states the manual therapy has helped the most.       Objective: See treatment diary below      Assessment: Pt tolerated today's session well with no adverse symptoms. Pt continues to show moderate soft tissue restrictions in R bicep and deltoid with STM and PROM. Pt would benefit from continued skilled PT to improve R shoulder strength, mobility, ROM, flexibility, and functional ability. Continue to progress as able. Plan: Continue per plan of care. Progress treatment as tolerated.        Precautions: HTN, PVD,memory change     Manuals 11/9 11/13  10/12 10/17 10/19 10/24 10/26 11/2 11/6   PROM  DK HY  PWK PWK DK PWK DK PWK HY   Bicep STM  DK HY  PWK PWK DK PWK DK PWK HY                             Neuro Re-Ed 11/9 11/13  10/12 10/17 10/19 10/24 1036    ata rows/ extensions  2x10 12# 2x10 12#  3x10 7.5# 3x10 8# nv    2x10 10#   No moneys       2x15 PTB        Shoulder ER/IR  2x10 4# ER/IR 2x10 4# ER/IR    2x10 3# ER  2x10 4# ER/IR  2x10 4# ER/IR   scap act              Scap punches 2x10 4# bar 2x10 4# bar   20x 3# bar 20x 3# bar 20x 3# bar  20x 4# bar 2x10 4# bar   Crossbody Str             Wall Ball Roll 20x red ball U/D, S/S, cw, ccw 20x red ball U/D, S/S, cw, ccw     20x red ball U/D, S/S, Cw, Ccw  20x red ball U/D, S/S, Cw, Ccw 20x red ball U/D, S/S, Cw, Ccgabriela   Ther Ex 11/9 11/13  10/12 10/17 10/19 10/24  11/2 11/6   UT stretch              Finger ladder 10x10" flex 10x10" flex  10x5" ea 10x5" ea 10x5"    10x10" 10x10"   Standing cane abd AAROM 10x10"  10x10"      10x10" 10x10" 10x10"   IR stretch              Supine cane flexion/ ER  2x10 4# bar 2x10 4# bar   20x ea 3# bar 20x ea 3# bar 20x ea 3# bar  20x ea 4# bar 2x10 4# bar   Bicep curls      2x10 3# 2x10 4# ea       Wall Clocks 2x15 GTB 2x15 GTB  NV 2x15 PTB nv 2x15 GTB  2x15 GTB 2x15 GTB   Wall Walks nv   5 laps PTB 5 laps PTB nv 2x15 GTB  5 laps GBT 5 laps GTB   Chest Press 2x10 4# bar 2x10 4# bar       2x10 4# bar 2x10 4# bar   Chop and Lifts    3x10 5#         Ther Activity 11/9 11/13  10/12 10/17 10/19 10/24 10/26 11/2 11/6   UBE 3'/3' 4'/4'  4'/4' 4'/4' 3'/3' 3'/3' 3'/3' 4'/4' 4'/4'   Pulleys              Patient education         DK     Cones     2-1#  2-2#  1-3# 2-1#, 2-2#, 1-3# 2-1#, 2-2#, 1-3#      FOTO             Modalities  11/13  10/12 10/17  10/24      Cp prn              M[facility]

## 2023-11-16 ENCOUNTER — OFFICE VISIT (OUTPATIENT)
Dept: PHYSICAL THERAPY | Facility: CLINIC | Age: 73
End: 2023-11-16
Payer: MEDICARE

## 2023-11-16 DIAGNOSIS — M25.511 BILATERAL SHOULDER PAIN, UNSPECIFIED CHRONICITY: ICD-10-CM

## 2023-11-16 DIAGNOSIS — M25.512 BILATERAL SHOULDER PAIN, UNSPECIFIED CHRONICITY: ICD-10-CM

## 2023-11-16 DIAGNOSIS — M25.611 DECREASED ROM OF RIGHT SHOULDER: Primary | ICD-10-CM

## 2023-11-16 PROCEDURE — 97110 THERAPEUTIC EXERCISES: CPT

## 2023-11-16 PROCEDURE — 97140 MANUAL THERAPY 1/> REGIONS: CPT

## 2023-11-16 NOTE — PROGRESS NOTES
Daily Note     Today's date: 2023  Patient name: Clare Rubio  : 1950  MRN: [id number]  Referring provider: Gilmer Habermann, MD  Dx:   Encounter Diagnosis     ICD-10-CM    1. Decreased ROM of right shoulder  M25.611       2. Bilateral shoulder pain, unspecified chronicity  M25.511     M25.512           Start Time: 1100  Stop Time: 1138  Total time in clinic (min): 38 minutes    Subjective: Pt stated that she is doing well today but continues to have tightness of her R biceps. Objective: See treatment diary below      Assessment: Pt tolerated warm up on UBE well at beginning of session without increase in discomfort during or after activity. Pt tolerated manual biceps STM and biceps stretch with overpressure with reported decrease in stiffness post manual treatment. Pt stated that she did not want to perform ball rolls or scap clock activity today due to having some discomfort last visit after performance. Pt showed good tolerance to progression of resistance with R shoulder strengthening activities. Pt would benefit from continued skilled PT to improve R shoulder strength, ROM, flexibility, and functional ability. Plan: Continue per plan of care. Progress treatment as tolerated.        Precautions: HTN, PVD,memory change     Manuals 11/9 11/13 11/16     10/26 11/2 11/6   PROM  DK HY DK     DK PWK HY   Bicep STM  DK HY DK     DK PWK HY                             Neuro Re-Ed      1036    ata rows/ extensions  2x10 12# 2x10 12# 2x10 13#        2x10 10#   No moneys              Shoulder ER/IR  2x10 4# ER/IR 2x10 4# ER/IR 2x10 4#/6#     2x10 4# ER/IR  2x10 4# ER/IR   scap act              Scap punches 2x10 4# bar 2x10 4# bar 2x10 4# bar      20x 4# bar 2x10 4# bar   Crossbody Str             Wall Ball Roll 20x red ball U/D, S/S, cw, ccw 20x red ball U/D, S/S, cw, ccw deferred      20x red ball U/D, S/S, Cw, Ccw 20x red ball U/D, S/S, Cw, Ccw   Ther Ex  11/2 11/6   UT stretch              Finger ladder 10x10" flex 10x10" flex nv      10x10" 10x10"   Standing cane abd AAROM 10x10"  10x10" 10x10"     10x10" 10x10" 10x10"   IR stretch              Supine cane flexion/ ER  2x10 4# bar 2x10 4# bar 2x10 4# bar      20x ea 4# bar 2x10 4# bar   Bicep curls              Shoulder raises   20x 4# flex          Wall Clocks 2x15 GTB 2x15 GTB deferred      2x15 GTB 2x15 GTB   Wall Walks nv        5 laps GBT 5 laps GTB   Chest Press 2x10 4# bar 2x10 4# bar 2x10 4# bar      2x10 4# bar 2x10 4# bar   Chop and Lifts             Ther Activity 11/9 11/13 11/16     10/26 11/2 11/6   UBE 3'/3' 4'/4' 3'/3'     3'/3' 4'/4' 4'/4'   Pulleys              Patient education         DK     Cones             FOTO             Modalities  11/13           Cp prn              MHP

## 2023-11-20 ENCOUNTER — EVALUATION (OUTPATIENT)
Dept: PHYSICAL THERAPY | Facility: CLINIC | Age: 73
End: 2023-11-20
Payer: MEDICARE

## 2023-11-20 DIAGNOSIS — M25.511 BILATERAL SHOULDER PAIN, UNSPECIFIED CHRONICITY: ICD-10-CM

## 2023-11-20 DIAGNOSIS — M25.611 DECREASED ROM OF RIGHT SHOULDER: Primary | ICD-10-CM

## 2023-11-20 DIAGNOSIS — M25.512 BILATERAL SHOULDER PAIN, UNSPECIFIED CHRONICITY: ICD-10-CM

## 2023-11-20 DIAGNOSIS — S42.294D OTHER CLOSED NONDISPLACED FRACTURE OF PROXIMAL END OF RIGHT HUMERUS WITH ROUTINE HEALING, SUBSEQUENT ENCOUNTER: ICD-10-CM

## 2023-11-20 PROCEDURE — 97164 PT RE-EVAL EST PLAN CARE: CPT

## 2023-11-20 PROCEDURE — 97110 THERAPEUTIC EXERCISES: CPT

## 2023-11-20 PROCEDURE — 97140 MANUAL THERAPY 1/> REGIONS: CPT

## 2023-11-20 NOTE — PROGRESS NOTES
Daily Note     Today's date: 2023  Patient name: Elyse Samll  : 1950  MRN: [id number]  Referring provider: Karlene Dee MD  Dx:   Encounter Diagnosis     ICD-10-CM    1. Decreased ROM of right shoulder  M25.611       2. Bilateral shoulder pain, unspecified chronicity  M25.511     M25.512       3. Other closed nondisplaced fracture of proximal end of right humerus with routine healing, subsequent encounter  S42.294D           Start Time: 1200  Stop Time: 1240  Total time in clinic (min): 40 minutes    Subjective: Pt family member present during session today to help with translation. Pt stated that she is feeling a little better today but continues to have muscular spasms of her R bicep muscles when at home randomly. Objective: See treatment diary below    Active Range of Motion   Right Shoulder   Flexion: 175 degrees with pain at end range  Abduction: 145 degrees with pain        Strength/Myotome Testing     Left Shoulder      Planes of Motion   Flexion: 5   Abduction: 5   External rotation at 0°: 5   Internal rotation at 0°: 4+      Isolated Muscles   Biceps: 5   Triceps: 5     Right Shoulder      Planes of Motion   Flexion: 5 (painful)  Abduction: 4+ (painful)  External rotation at 0°: 4   Internal rotation at 0°: 4+ ( painful)     Isolated Muscles   Biceps: 4+ (painful)  Triceps: 5     Tenderness   R biceps w soft tissue adhesions palpable  R triceps w soft tissue adhesions palpable      Assessment: Pt presents to the clinic for re evaluation today. Pt continues to show gradual improvement in R shoulder strength grossly but continues to have limitations into ER and painful with resisted abduction and IR. Pt shows R shoulder flexion ROM WFL but has pain towards last 10 degrees of end range, pt showed improvement in R abduction ROM but continues to have pain when approaching 150 degrees, continue to work on mobility and stretching activities to improve ROM.  Pt continues to have moderate to significant tightness of her R biceps and triceps musculature with palpable soft tissue adhesions, educated pt to continue performing mobility and stretching activities for the R elbow at home and will put more focus manual techniques and modalities to address pain with elbow and shoulder motion. Overall, pt is slowly progressing and would benefit from continued skilled PT to improve RUE flexibility, mobility, strength, and functional ability. Plan: Continue per plan of care. Progress treatment as tolerated.        Precautions: HTN, PVD,memory change     Manuals 11/9 11/13 11/16 11/20    10/26 11/2 11/6   PROM  DK HY DK DK    DK PWK HY   Bicep STM  DK HY DK DK    DK PWK HY                Re-eval    DK         Neuro Re-Ed 11/9 11/13 11/16 11/20    1036 11/2 11/6   ata rows/ extensions  2x10 12# 2x10 12# 2x10 13#        2x10 10#   No moneys              Shoulder ER/IR  2x10 4# ER/IR 2x10 4# ER/IR 2x10 4#/6#     2x10 4# ER/IR  2x10 4# ER/IR   scap act              Scap punches 2x10 4# bar 2x10 4# bar 2x10 4# bar      20x 4# bar 2x10 4# bar   Crossbody Str             Wall Ball Roll 20x red ball U/D, S/S, cw, ccw 20x red ball U/D, S/S, cw, ccw deferred      20x red ball U/D, S/S, Cw, Ccw 20x red ball U/D, S/S, Cw, Ccw   Ther Ex 11/9 11/13 11/16 11/20 11/2 11/6   UT stretch              Finger ladder 10x10" flex 10x10" flex nv      10x10" 10x10"   Standing cane abd AAROM 10x10"  10x10" 10x10" 10x10"    10x10" 10x10" 10x10"   IR stretch              Supine cane flexion/ ER  2x10 4# bar 2x10 4# bar 2x10 4# bar      20x ea 4# bar 2x10 4# bar   Bicep curls              Shoulder raises   20x 4# flex          Wall Clocks 2x15 GTB 2x15 GTB deferred      2x15 GTB 2x15 GTB   Wall Walks nv        5 laps GBT 5 laps GTB   Chest Press 2x10 4# bar 2x10 4# bar 2x10 4# bar      2x10 4# bar 2x10 4# bar   Chop and Lifts             Ther Activity 11/9 11/13 11/16 11/20    10/26 11/2 11/6   UBE 3'/3' 4'/4' 3'/3' 3'/3'    3'/3' 4'/4' 4'/4'   Timothy              Patient education         DK     Cones             FOTO             Modalities  11/13 11/20         Cp prn              MHP    10'

## 2023-12-06 ENCOUNTER — OFFICE VISIT (OUTPATIENT)
Dept: PHYSICAL THERAPY | Facility: CLINIC | Age: 73
End: 2023-12-06
Payer: MEDICARE

## 2023-12-06 DIAGNOSIS — M25.512 BILATERAL SHOULDER PAIN, UNSPECIFIED CHRONICITY: ICD-10-CM

## 2023-12-06 DIAGNOSIS — S42.294D OTHER CLOSED NONDISPLACED FRACTURE OF PROXIMAL END OF RIGHT HUMERUS WITH ROUTINE HEALING, SUBSEQUENT ENCOUNTER: ICD-10-CM

## 2023-12-06 DIAGNOSIS — M25.511 BILATERAL SHOULDER PAIN, UNSPECIFIED CHRONICITY: ICD-10-CM

## 2023-12-06 DIAGNOSIS — M25.611 DECREASED ROM OF RIGHT SHOULDER: Primary | ICD-10-CM

## 2023-12-06 PROCEDURE — 97140 MANUAL THERAPY 1/> REGIONS: CPT

## 2023-12-06 PROCEDURE — 97110 THERAPEUTIC EXERCISES: CPT

## 2023-12-06 NOTE — PROGRESS NOTES
Daily Note     Today's date: 2023  Patient name: Kevin Beck  : 1950  MRN: [id number]  Referring provider: Jacquelyn Hashimoto, MD  Dx:   Encounter Diagnosis     ICD-10-CM    1. Decreased ROM of right shoulder  M25.611       2. Bilateral shoulder pain, unspecified chronicity  M25.511     M25.512       3. Other closed nondisplaced fracture of proximal end of right humerus with routine healing, subsequent encounter  S42.294D           Start Time: 1025  Stop Time: 1110  Total time in clinic (min): 45 minutes    Subjective: Pt stated that she still has a bit of pain in her R arm. Objective: See treatment diary below      Assessment: Pt tolerated warm up on UBE well at beginning of session without increase in discomfort during or after activity. Pt tolerated manual R bicep STM and RUE PROM with reported decrease in stiffness post manual treatment. Pt focused today primarily on RUE mobility activities for RUE to improve flexibility and ROM which pt did show slight improvement in compared to last visit. Pt continues to have small area of palpable tightness on the lateral aspect of the R bicep that is TTP but decreased with manual STM, continue to perform manual STM. Pt tolerated application of MHP at end of session well with reported decrease in discomfort post modality. Pt would benefit from continued skilled PT to improve RUE strength, mobility, flexibility, and functional ability. Plan: Continue per plan of care. Progress treatment as tolerated.        Precautions: HTN, PVD,memory change     Manuals 11/9 11/13 11/16 11/20 12/6   10/26 11/2 11/6   PROM  DK HY DK DK DK   DK PWK HY   Bicep STM  DK HY DK DK DK   DK PWK HY                Re-eval    DK         Neuro Re-Ed    1036    ata rows/ extensions  2x10 12# 2x10 12# 2x10 13#        2x10 10#   No moneys              Shoulder ER/IR  2x10 4# ER/IR 2x10 4# ER/IR 2x10 4#/6#     2x10 4# ER/IR  2x10 4# ER/IR   scap act              Scap punches 2x10 4# bar 2x10 4# bar 2x10 4# bar  2x10 4# bar    20x 4# bar 2x10 4# bar   Crossbody Str             Wall Ball Roll 20x red ball U/D, S/S, cw, ccw 20x red ball U/D, S/S, cw, ccw deferred      20x red ball U/D, S/S, Cw, Ccw 20x red ball U/D, S/S, Cw, Ccw   Ther Ex 11/9 11/13 11/16 11/20 12/6 11/2 11/6   UT stretch              Finger ladder 10x10" flex 10x10" flex nv      10x10" 10x10"   Standing cane abd AAROM 10x10"  10x10" 10x10" 10x10" 20x10"   10x10" 10x10" 10x10"   IR stretch              Supine cane flexion/ ER  2x10 4# bar 2x10 4# bar 2x10 4# bar  2x10 4# bar    20x ea 4# bar 2x10 4# bar   Bicep curls      20x 4#         Shoulder raises   20x 4# flex  20x 4# flex        Wall Clocks 2x15 GTB 2x15 GTB deferred      2x15 GTB 2x15 GTB   Wall Walks nv        5 laps GBT 5 laps GTB   Chest Press 2x10 4# bar 2x10 4# bar 2x10 4# bar  2x10 4# bar    2x10 4# bar 2x10 4# bar   Chop and Lifts             Ther Activity 11/9 11/13 11/16 11/20 12/6   10/26 11/2 11/6   UBE 3'/3' 4'/4' 3'/3' 3'/3' 3'/3'   3'/3' 4'/4' 4'/4'   Chingeys              Patient education      DK   DK     Cones             FOTO             Modalities  11/13 11/20 12/6        Cp prn              MHP    10' 10'

## 2023-12-08 ENCOUNTER — EVALUATION (OUTPATIENT)
Dept: PHYSICAL THERAPY | Facility: CLINIC | Age: 73
End: 2023-12-08
Payer: MEDICARE

## 2023-12-08 DIAGNOSIS — M25.511 BILATERAL SHOULDER PAIN, UNSPECIFIED CHRONICITY: ICD-10-CM

## 2023-12-08 DIAGNOSIS — S42.294D OTHER CLOSED NONDISPLACED FRACTURE OF PROXIMAL END OF RIGHT HUMERUS WITH ROUTINE HEALING, SUBSEQUENT ENCOUNTER: ICD-10-CM

## 2023-12-08 DIAGNOSIS — M25.611 DECREASED ROM OF RIGHT SHOULDER: Primary | ICD-10-CM

## 2023-12-08 DIAGNOSIS — M25.512 BILATERAL SHOULDER PAIN, UNSPECIFIED CHRONICITY: ICD-10-CM

## 2023-12-08 PROCEDURE — 97164 PT RE-EVAL EST PLAN CARE: CPT

## 2023-12-08 PROCEDURE — 97110 THERAPEUTIC EXERCISES: CPT

## 2023-12-08 PROCEDURE — 97140 MANUAL THERAPY 1/> REGIONS: CPT

## 2023-12-08 NOTE — PROGRESS NOTES
Re Evaluation and Discharge note    Today's date: 2023  Patient name: Rupali Mukherjee  : 1950  MRN: [id number]  Referring provider: Jarrod Sherman MD  Dx:   Encounter Diagnosis     ICD-10-CM    1. Decreased ROM of right shoulder  M25.611       2. Bilateral shoulder pain, unspecified chronicity  M25.511     M25.512       3. Other closed nondisplaced fracture of proximal end of right humerus with routine healing, subsequent encounter  S42.294D           Start Time: 1030  Stop Time: 1105  Total time in clinic (min): 35 minutes    Subjective: Pt stated that she is doing well today but continues to have some discomfort on the lateral aspect of her R biceps. Objective: See treatment diary below    Active Range of Motion   Right Shoulder   Flexion: 180 degrees with pain at end range  Abduction: 145 degrees with pain        Strength/Myotome Testing     Left Shoulder      Planes of Motion   Flexion: 5   Abduction: 5   External rotation at 0°: 5   Internal rotation at 0°: 4+      Isolated Muscles   Biceps: 5   Triceps: 5     Right Shoulder      Planes of Motion   Flexion: 5  Abduction: 5 (painful)  External rotation at 0°: 4+   Internal rotation at 0°: 5     Isolated Muscles   Biceps: 4+ (painful)  Triceps: 5      Tenderness   R biceps w soft tissue adhesions palpable (decreased since last RE)  R triceps w soft tissue adhesions palpable (decreased since last RE)      Assessment: Pt presents to the clinic for re evaluation. Pt showed good improvement of R shoulder strength with decreased pain but continues to have some pain with break test for R shoulder abduction and mild weakness into R shoulder ER. Pt showed full ROM of R shoulder flexion with mild pain at end range but continues to be tight and has pain into R shoulder abduction when approaching 150 degrees.  Discussed current PT POC with pt, overall pt has shown good improvement over the past couple weeks with shift of focus to R bicep and triceps STM and modality to reduce pain and address soft tissue adhesions, which has led to better functional ability. Suggested to pt that she could benefit from transition to HEP at this time, pt stated that she would like to keep coming if possible due to benefits seen from manual treatment and modality, plan to perform HEP for next few weeks and if pain persists or function starts to decline to come back in for more formal PT. Pt was given updated written HEP and resistance bands to perform exercises at home. Pt was discharged. Plan: Pt was discharged.      Precautions: HTN, PVD,memory change     Manuals 11/9 11/13 11/16 11/20 12/6 12/8  10/26 11/2 11/6   PROM  DK HY DK DK DK DK  DK PWK HY   Bicep STM  DK HY DK DK DK DK  DK PWK HY                Re-eval    DK  DK       Neuro Re-Ed 11/9 11/13 11/16 11/20 12/6 12/8  1036 11/2 11/6   ata rows/ extensions  2x10 12# 2x10 12# 2x10 13#        2x10 10#   No moneys              Shoulder ER/IR  2x10 4# ER/IR 2x10 4# ER/IR 2x10 4#/6#     2x10 4# ER/IR  2x10 4# ER/IR   scap act              Scap punches 2x10 4# bar 2x10 4# bar 2x10 4# bar  2x10 4# bar    20x 4# bar 2x10 4# bar   Crossbody Str             Wall Ball Roll 20x red ball U/D, S/S, cw, ccw 20x red ball U/D, S/S, cw, ccw deferred      20x red ball U/D, S/S, Cw, Ccw 20x red ball U/D, S/S, Cw, Ccw   Pt Edu      DK w HEP       Ther Ex 11/9 11/13 11/16 11/20 12/6 12/8 11/2 11/6   UT stretch              Finger ladder 10x10" flex 10x10" flex nv      10x10" 10x10"   Standing cane abd AAROM 10x10"  10x10" 10x10" 10x10" 20x10"   10x10" 10x10" 10x10"   IR stretch              Supine cane flexion/ ER  2x10 4# bar 2x10 4# bar 2x10 4# bar  2x10 4# bar    20x ea 4# bar 2x10 4# bar   Bicep curls      20x 4#         Shoulder raises   20x 4# flex  20x 4# flex        Wall Clocks 2x15 GTB 2x15 GTB deferred      2x15 GTB 2x15 GTB   Wall Walks nv        5 laps GBT 5 laps GTB   Chest Press 2x10 4# bar 2x10 4# bar 2x10 4# bar  2x10 4# bar 2x10 4# bar 2x10 4# bar   Chop and Lifts             Ther Activity 11/9 11/13 11/16 11/20 12/6 12/8  10/26 11/2 11/6   UBE 3'/3' 4'/4' 3'/3' 3'/3' 3'/3' 3'/3'  3'/3' 4'/4' 4'/4'   Pulleys              Patient education      DK DK  DK     Cones             FOTO             Modalities  11/13 11/20 12/6 12/8       Cp prn              MHP    10' 10' 10'

## 2024-01-15 ENCOUNTER — HOSPITAL ENCOUNTER (OUTPATIENT)
Dept: BONE DENSITY | Facility: CLINIC | Age: 74
Discharge: HOME | End: 2024-01-15
Payer: MEDICARE

## 2024-01-15 DIAGNOSIS — M81.0 OSTEOPOROSIS, UNSPECIFIED OSTEOPOROSIS TYPE, UNSPECIFIED PATHOLOGICAL FRACTURE PRESENCE: ICD-10-CM

## 2024-01-15 PROCEDURE — 77080 DXA BONE DENSITY AXIAL: CPT

## 2024-05-03 ENCOUNTER — APPOINTMENT (OUTPATIENT)
Dept: LAB | Facility: HOSPITAL | Age: 74
End: 2024-05-03
Payer: MEDICARE

## 2024-05-03 DIAGNOSIS — R73.9 HYPERGLYCEMIA: ICD-10-CM

## 2024-05-03 DIAGNOSIS — E78.2 MIXED HYPERLIPIDEMIA: ICD-10-CM

## 2024-05-03 DIAGNOSIS — I10 ESSENTIAL HYPERTENSION: ICD-10-CM

## 2024-05-03 LAB
ALBUMIN SERPL BCP-MCNC: 4 G/DL (ref 3.5–5)
ALP SERPL-CCNC: 55 U/L (ref 34–104)
ALT SERPL W P-5'-P-CCNC: 17 U/L (ref 7–52)
ANION GAP SERPL CALCULATED.3IONS-SCNC: 7 MMOL/L (ref 4–13)
AST SERPL W P-5'-P-CCNC: 19 U/L (ref 13–39)
BASOPHILS # BLD AUTO: 0.06 THOUSANDS/ÂΜL (ref 0–0.1)
BASOPHILS NFR BLD AUTO: 1 % (ref 0–1)
BILIRUB SERPL-MCNC: 1.51 MG/DL (ref 0.2–1)
BUN SERPL-MCNC: 10 MG/DL (ref 5–25)
CALCIUM SERPL-MCNC: 9.2 MG/DL (ref 8.4–10.2)
CHLORIDE SERPL-SCNC: 106 MMOL/L (ref 96–108)
CHOLEST SERPL-MCNC: 176 MG/DL
CK SERPL-CCNC: 130 U/L (ref 26–192)
CO2 SERPL-SCNC: 28 MMOL/L (ref 21–32)
CREAT SERPL-MCNC: 0.59 MG/DL (ref 0.6–1.3)
EOSINOPHIL # BLD AUTO: 0.23 THOUSAND/ÂΜL (ref 0–0.61)
EOSINOPHIL NFR BLD AUTO: 3 % (ref 0–6)
ERYTHROCYTE [DISTWIDTH] IN BLOOD BY AUTOMATED COUNT: 13.2 % (ref 11.6–15.1)
EST. AVERAGE GLUCOSE BLD GHB EST-MCNC: 114 MG/DL
GFR SERPL CREATININE-BSD FRML MDRD: 91 ML/MIN/1.73SQ M
GLUCOSE P FAST SERPL-MCNC: 101 MG/DL (ref 65–99)
HBA1C MFR BLD: 5.6 %
HCT VFR BLD AUTO: 42 % (ref 34.8–46.1)
HDLC SERPL-MCNC: 54 MG/DL
HGB BLD-MCNC: 12.7 G/DL (ref 11.5–15.4)
IMM GRANULOCYTES # BLD AUTO: 0.02 THOUSAND/UL (ref 0–0.2)
IMM GRANULOCYTES NFR BLD AUTO: 0 % (ref 0–2)
LDLC SERPL CALC-MCNC: 104 MG/DL (ref 0–100)
LYMPHOCYTES # BLD AUTO: 2.12 THOUSANDS/ÂΜL (ref 0.6–4.47)
LYMPHOCYTES NFR BLD AUTO: 28 % (ref 14–44)
MCH RBC QN AUTO: 28.5 PG (ref 26.8–34.3)
MCHC RBC AUTO-ENTMCNC: 30.2 G/DL (ref 31.4–37.4)
MCV RBC AUTO: 94 FL (ref 82–98)
MONOCYTES # BLD AUTO: 0.44 THOUSAND/ÂΜL (ref 0.17–1.22)
MONOCYTES NFR BLD AUTO: 6 % (ref 4–12)
NEUTROPHILS # BLD AUTO: 4.76 THOUSANDS/ÂΜL (ref 1.85–7.62)
NEUTS SEG NFR BLD AUTO: 62 % (ref 43–75)
NONHDLC SERPL-MCNC: 122 MG/DL
NRBC BLD AUTO-RTO: 0 /100 WBCS
PLATELET # BLD AUTO: 226 THOUSANDS/UL (ref 149–390)
PMV BLD AUTO: 10.1 FL (ref 8.9–12.7)
POTASSIUM SERPL-SCNC: 4 MMOL/L (ref 3.5–5.3)
PROT SERPL-MCNC: 6.6 G/DL (ref 6.4–8.4)
RBC # BLD AUTO: 4.46 MILLION/UL (ref 3.81–5.12)
SODIUM SERPL-SCNC: 141 MMOL/L (ref 135–147)
TRIGL SERPL-MCNC: 92 MG/DL
WBC # BLD AUTO: 7.63 THOUSAND/UL (ref 4.31–10.16)

## 2024-05-03 PROCEDURE — 82550 ASSAY OF CK (CPK): CPT

## 2024-05-03 PROCEDURE — 36415 COLL VENOUS BLD VENIPUNCTURE: CPT

## 2024-05-03 PROCEDURE — 85025 COMPLETE CBC W/AUTO DIFF WBC: CPT

## 2024-05-03 PROCEDURE — 83036 HEMOGLOBIN GLYCOSYLATED A1C: CPT

## 2024-05-03 PROCEDURE — 80061 LIPID PANEL: CPT

## 2024-05-03 PROCEDURE — 80053 COMPREHEN METABOLIC PANEL: CPT

## 2024-05-08 ENCOUNTER — HOSPITAL ENCOUNTER (OUTPATIENT)
Dept: RADIOLOGY | Facility: HOSPITAL | Age: 74
Discharge: HOME | End: 2024-05-08
Payer: MEDICARE

## 2024-05-08 ENCOUNTER — HOSPITAL ENCOUNTER (OUTPATIENT)
Dept: NON INVASIVE DIAGNOSTICS | Facility: HOSPITAL | Age: 74
Discharge: HOME | End: 2024-05-08
Payer: MEDICARE

## 2024-05-08 VITALS — BODY MASS INDEX: 29.83 KG/M2 | HEIGHT: 61 IN | WEIGHT: 158 LBS

## 2024-05-08 DIAGNOSIS — R07.9 CHEST PAIN, UNSPECIFIED TYPE: ICD-10-CM

## 2024-05-08 LAB
MAX HR PERCENT: 86 %
MAX HR: 127 BPM
RATE PRESSURE PRODUCT: NORMAL
SL CV REST NUCLEAR ISOTOPE DOSE: 10.9 MCI
SL CV STRESS NUCLEAR ISOTOPE DOSE: 32 MCI
SL CV STRESS RECOVERY BP: NORMAL MMHG
SL CV STRESS RECOVERY HR: 77 BPM
SL CV STRESS RECOVERY O2 SAT: 98 %
SL CV STRESS STAGE REACHED: 2
STRESS ANGINA INDEX: 0
STRESS BASELINE BP: NORMAL MMHG
STRESS BASELINE HR: 69 BPM
STRESS O2 SAT REST: 97 %
STRESS PEAK HR: 127 BPM
STRESS POST ESTIMATED WORKLOAD: 7 METS
STRESS POST EXERCISE DUR MIN: 5 MIN
STRESS POST EXERCISE DUR SEC: 20 SEC
STRESS POST O2 SAT PEAK: 99 %
STRESS POST PEAK BP: 190 MMHG

## 2024-05-08 PROCEDURE — 93018 CV STRESS TEST I&R ONLY: CPT | Performed by: INTERNAL MEDICINE

## 2024-05-08 PROCEDURE — 78452 HT MUSCLE IMAGE SPECT MULT: CPT | Performed by: INTERNAL MEDICINE

## 2024-05-08 PROCEDURE — A9502 TC99M TETROFOSMIN: HCPCS

## 2024-05-08 PROCEDURE — 93017 CV STRESS TEST TRACING ONLY: CPT

## 2024-05-08 PROCEDURE — 93016 CV STRESS TEST SUPVJ ONLY: CPT | Performed by: INTERNAL MEDICINE

## 2024-05-08 PROCEDURE — 78452 HT MUSCLE IMAGE SPECT MULT: CPT

## 2024-05-18 LAB
MAX HR PERCENT: 86 %
MAX HR: 127 BPM
NUC STRESS EJECTION FRACTION: 82 %
RATE PRESSURE PRODUCT: NORMAL
SL CV REST NUCLEAR ISOTOPE DOSE: 10.9 MCI
SL CV STRESS NUCLEAR ISOTOPE DOSE: 32 MCI
SL CV STRESS RECOVERY BP: NORMAL MMHG
SL CV STRESS RECOVERY HR: 77 BPM
SL CV STRESS RECOVERY O2 SAT: 98 %
SL CV STRESS STAGE REACHED: 2
STRESS ANGINA INDEX: 0
STRESS BASELINE BP: NORMAL MMHG
STRESS BASELINE HR: 69 BPM
STRESS O2 SAT REST: 97 %
STRESS PEAK HR: 127 BPM
STRESS POST ESTIMATED WORKLOAD: 7 METS
STRESS POST EXERCISE DUR MIN: 5 MIN
STRESS POST EXERCISE DUR SEC: 20 SEC
STRESS POST O2 SAT PEAK: 99 %
STRESS POST PEAK BP: 190 MMHG
STRESS/REST PERFUSION RATIO: 0.92

## 2024-05-25 ENCOUNTER — APPOINTMENT (EMERGENCY)
Dept: RADIOLOGY | Facility: HOSPITAL | Age: 74
End: 2024-05-25
Payer: MEDICARE

## 2024-05-25 ENCOUNTER — HOSPITAL ENCOUNTER (EMERGENCY)
Facility: HOSPITAL | Age: 74
Discharge: HOME | End: 2024-05-25
Attending: INTERNAL MEDICINE
Payer: MEDICARE

## 2024-05-25 ENCOUNTER — APPOINTMENT (EMERGENCY)
Dept: CT IMAGING | Facility: HOSPITAL | Age: 74
End: 2024-05-25
Payer: MEDICARE

## 2024-05-25 VITALS
RESPIRATION RATE: 20 BRPM | SYSTOLIC BLOOD PRESSURE: 133 MMHG | DIASTOLIC BLOOD PRESSURE: 69 MMHG | WEIGHT: 158.73 LBS | BODY MASS INDEX: 29.99 KG/M2 | TEMPERATURE: 97.9 F | HEART RATE: 70 BPM | OXYGEN SATURATION: 98 %

## 2024-05-25 DIAGNOSIS — M79.672 LEFT FOOT PAIN: Primary | ICD-10-CM

## 2024-05-25 DIAGNOSIS — S92.902A CLOSED FRACTURE OF LEFT FOOT, INITIAL ENCOUNTER: ICD-10-CM

## 2024-05-25 PROCEDURE — 73610 X-RAY EXAM OF ANKLE: CPT

## 2024-05-25 PROCEDURE — 73630 X-RAY EXAM OF FOOT: CPT

## 2024-05-25 PROCEDURE — 99284 EMERGENCY DEPT VISIT MOD MDM: CPT

## 2024-05-25 PROCEDURE — 99284 EMERGENCY DEPT VISIT MOD MDM: CPT | Performed by: PHYSICIAN ASSISTANT

## 2024-05-25 PROCEDURE — 73700 CT LOWER EXTREMITY W/O DYE: CPT

## 2024-05-25 NOTE — ED PROVIDER NOTES
History  Chief Complaint   Patient presents with    Foot Injury     L foot; tripped 2 days ago; no head strike     74-year-old female presents to the emergency department with complaints of left-sided foot pain.  States that she tripped 2 days ago and twisted her left foot.  Has had some pain and swelling in the area since that time.  Worse with ambulation.  Denies history of injury to this foot.  Taking Tylenol intermittently at home for discomfort without relief.      History provided by:  Relative   used: Yes ([id number])        Prior to Admission Medications   Prescriptions Last Dose Informant Patient Reported? Taking?   Acetaminophen Extra Strength 500 MG TABS   No No   Sig: AS DIRECTED   Aspirin Low Dose 81 MG EC tablet   No No   Sig: AS DIRECTED   Calcium Carb-Cholecalciferol 600-10 MG-MCG TABS   No No   Sig: Take 1 tablet by mouth every 12 (twelve) hours   Cholecalciferol (Vitamin D3) 50 MCG (2000 UT) capsule   No No   Sig: TAKE ONE CAPSULE BY MOUTH ONCE DAILY   Easy Comfort Lancets MISC  Self, Child No No   Sig: USE TO TEST THE BLOOD SUGAR TWICE A DAY   FREESTYLE LITE test strip  Self, Child No No   Sig: USE TO TEST THE BLOOD SUGAR TWICE A DAY   UltiCare Alcohol Swabs 70 % PADS  Child, Self No No   Sig: USE AS DIRECTED THREE TIMES A DAY   Vascepa 1 g CAPS  Self, Child No No   Sig: TAKE TWO CAPSULES BY MOUTH TWICE A DAY   Vitamin D, Ergocalciferol, 50 MCG (2000 UT) CAPS  Self, Child No No   Sig: Take 1 capsule by mouth daily   b complex vitamins capsule   No No   Sig: Take 1 capsule by mouth daily   benzonatate (TESSALON PERLES) 100 mg capsule  Self, Child No No   Sig: Take 1 capsule (100 mg total) by mouth 3 (three) times a day as needed for cough   bisoprolol-hydrochlorothiazide (ZIAC) 5-6.25 MG per tablet   No No   Sig: AS DIRECTED   calcium carbonate-vitamin D (OSCAL-D) 500 mg-200 units per tablet  Self, Child No No   Sig: TAKE ONE TABLET BY MOUTH EVERY 12 HOURS   calcium  carbonate-vitamin D (OSCAL-D) 500 mg-200 units per tablet  Self, Child No No   Sig: Take 1 tablet by mouth 2 (two) times a day with meals   calcium-vitamin D (OSCAL) 250-125 MG-UNIT per tablet  Self, Child No No   Sig: Take 1 tablet by mouth daily   clotrimazole-betamethasone (LOTRISONE) 1-0.05 % cream  Self, Child No No   Sig: APPLY TOPICALLY TO AFFECTED AREA TWICE A DAY   cyclobenzaprine (FLEXERIL) 5 mg tablet  Self, Child No No   Sig: Take 1 tablet (5 mg total) by mouth 3 (three) times a day as needed for muscle spasms for up to 30 doses   fluticasone (FLONASE) 50 mcg/act nasal spray  Self, Child No No   Si spray into each nostril daily   Patient taking differently: 1 spray into each nostril daily pt using only as needed   loratadine (CLARITIN) 10 mg tablet  Self, Child No No   Sig: Take 1 tablet (10 mg total) by mouth daily   Patient taking differently: Take 10 mg by mouth daily pt using this only as needed   loratadine-pseudoephedrine (CLARITIN-D 24-HOUR)  mg per 24 hr tablet  Child, Self No No   Sig: Take 1 tablet by mouth daily   magnesium oxide (MAG-OX) 400 mg tablet  Self, Child No No   Sig: Take 1 tablet (400 mg total) by mouth daily   meloxicam (MOBIC) 15 mg tablet  Self, Child No No   Sig: Take 1 tablet (15 mg total) by mouth daily   Patient taking differently: Take 15 mg by mouth daily pt is using this only as needed   methocarbamol (ROBAXIN) 500 mg tablet  Self, Child No No   Sig: Take 1 tablet (500 mg total) by mouth 3 (three) times a day   Patient taking differently: Take 500 mg by mouth 3 (three) times a day pt uses this only as needed   methylPREDNISolone 4 MG tablet therapy pack  Self, Child No No   Sig: Use as directed on package   naproxen (NAPROSYN) 500 mg tablet  Self, Child No No   Sig: Take 1 tablet (500 mg total) by mouth 2 (two) times a day with meals   triamcinolone (KENALOG) 0.1 % lotion  Child, Self No No   Sig: APPLY TOPICALLY TO AFFECTED AREA THREE TIMES A DAY       Facility-Administered Medications: None       Past Medical History:   Diagnosis Date    No known health problems     NO RELEVANT PAST MEDICAL HX       Past Surgical History:   Procedure Laterality Date    CHOLECYSTECTOMY      HERNIA REPAIR         Family History   Problem Relation Age of Onset    Heart disease Mother         CARDIOVASCULAR DISEASE: COD    Lung disease Father         COD    Heart attack Brother         MYOCARDIAL INFARCTION: COD    Heart disease Brother         CARDIOVASCULAR DISEASE    Arrhythmia Brother         PACEMAKER    Arrhythmia Brother         PACEMAKER    Aortic aneurysm Sister         ABDOMINAL: COD    Aortic aneurysm Sister         ABDOMINAL    No Known Problems Maternal Grandmother     No Known Problems Maternal Grandfather     No Known Problems Paternal Grandmother     No Known Problems Paternal Grandfather      I have reviewed and agree with the history as documented.    E-Cigarette/Vaping     E-Cigarette/Vaping Substances     Social History     Tobacco Use    Smoking status: Former     Types: Cigarettes    Smokeless tobacco: Current    Tobacco comments:     TOBACCO USE   Substance Use Topics    Alcohol use: Not Currently    Drug use: Not Currently       Review of Systems   Constitutional:  Negative for chills and fever.   HENT:  Negative for congestion, dental problem and sore throat.    Respiratory:  Negative for cough.    Cardiovascular:  Negative for chest pain.   Gastrointestinal:  Negative for abdominal pain.   Musculoskeletal:  Positive for arthralgias (foot pain). Negative for back pain and neck pain.   Skin:  Negative for rash and wound.   All other systems reviewed and are negative.      Physical Exam  Physical Exam  Vitals and nursing note reviewed.   Constitutional:       Appearance: She is well-developed.   HENT:      Head: Normocephalic and atraumatic.   Cardiovascular:      Rate and Rhythm: Normal rate and regular rhythm.   Pulmonary:      Effort: Pulmonary effort is  normal. No respiratory distress.      Breath sounds: No wheezing, rhonchi or rales.   Musculoskeletal:      Left foot: Decreased range of motion. Swelling, tenderness and bony tenderness present. No deformity, bunion, Charcot foot, foot drop or laceration.      Comments: Left foot with tenderness over the base of the first phalanx and base of the first metatarsal.  Generalized swelling and ecchymosis of the dorsum of the foot noted.   Skin:     General: Skin is warm and dry.   Neurological:      Mental Status: She is alert and oriented to person, place, and time.   Psychiatric:         Behavior: Behavior normal.         Vital Signs  ED Triage Vitals [05/25/24 1228]   Temperature Pulse Respirations Blood Pressure SpO2   97.9 °F (36.6 °C) 70 20 133/69 98 %      Temp Source Heart Rate Source Patient Position - Orthostatic VS BP Location FiO2 (%)   Oral Monitor Lying Left arm --      Pain Score       --           Vitals:    05/25/24 1228   BP: 133/69   Pulse: 70   Patient Position - Orthostatic VS: Lying         Visual Acuity      ED Medications  Medications - No data to display    Diagnostic Studies  Results Reviewed       None                   CT lower extremity wo contrast left   Final Result by Felix Albright MD (05/25 2396)      1.  There is mild incongruence of the articulation of the second metatarsal base and middle cuneiform bone. Difficult to exclude Lisfranc injury. Recommend orthopedic evaluation. Consider MRI for further evaluation.   2.  Subtle nondisplaced articular fracture of the medial base of the first proximal phalanx.   3.  Additional subtle nondisplaced cortical fracture of the medial base of the first metatarsal bone near the articular surface.            Workstation performed: [id number]         XR foot 3+ views LEFT   ED Interpretation by Zeny Barr PA-C (05/25 1300)   Avulsion fracture at the base of the first phalanx and the first metatarsal.      Final Result by Antwon Del Cid,  MD (05/26 0724)      No acute osseous abnormality.            Workstation performed: [id number]         XR ankle 3+ views LEFT   ED Interpretation by Zeny Barr PA-C (05/25 125)   No fracture      Final Result by Antwon Del Cid MD (05/26 0724)      No acute osseous abnormality.            Workstation performed: [id number]                    Procedures  Procedures         ED Course  ED Course as of 05/26/24 0800   Sat May 25, 2024   1516 Message sent to podiatry regarding CT findings.                               SBIRT 20yo+      Flowsheet Row Most Recent Value   Initial Alcohol Screen: US AUDIT-C     1. How often do you have a drink containing alcohol? 0 Filed at: 05/25/2024 1230   2. How many drinks containing alcohol do you have on a typical day you are drinking?  0 Filed at: 05/25/2024 1230   3a. Male UNDER 65: How often do you have five or more drinks on one occasion? 0 Filed at: 05/25/2024 1230   3b. FEMALE Any Age, or MALE 65+: How often do you have 4 or more drinks on one occassion? 0 Filed at: 05/25/2024 1230   Audit-C Score 0 Filed at: 05/25/2024 1230   JORDY: How many times in the past year have you...    Used an illegal drug or used a prescription medication for non-medical reasons? Never Filed at: 05/25/2024 1230                      Medical Decision Making  Differential diagnosis includes but not limited to: Foot contusion, foot fracture, foot sprain, Lisfranc injury    Problems Addressed:  Closed fracture of left foot, initial encounter: acute illness or injury  Left foot pain: acute illness or injury    Amount and/or Complexity of Data Reviewed  Radiology: ordered and independent interpretation performed. Decision-making details documented in ED Course.  Discussion of management or test interpretation with external provider(s): Discussed treatment plan with podiatry on-call.  Should keep patient in a postop shoe.  Follow-up scheduled for Tuesday, 5/28/2024 the office at 830.  Patient  instructed to bring discharge instructions with her to the appointment.             Disposition  Final diagnoses:   Left foot pain   Closed fracture of left foot, initial encounter     Time reflects when diagnosis was documented in both MDM as applicable and the Disposition within this note       Time User Action Codes Description Comment    5/25/2024  2:43 PM Zeny Barr Add [M79.672] Left foot pain     5/25/2024  3:17 PM Zeny Barr Add [S92.902A] Closed fracture of left foot, initial encounter           ED Disposition       ED Disposition   Discharge    Condition   Stable    Date/Time   Sat May 25, 2024 1443    Comment   Marcos Hoffmanni discharge to home/self care.                   Follow-up Information       Follow up With Specialties Details Why Contact Info    Sawyer Niño DPM Podiatry In 3 days Please go to the office for 8:30AM aptointment on Tuesday 5/28/24.  Let front office staff konnw that the ER provider and Dr Niño set the apointmetn up over the weekend. [address] [facility] [address],  [address]  [place] [address]  [phone]              Discharge Medication List as of 5/25/2024  3:26 PM        CONTINUE these medications which have NOT CHANGED    Details   Acetaminophen Extra Strength 500 MG TABS AS DIRECTED, Normal      Aspirin Low Dose 81 MG EC tablet AS DIRECTED, Normal      b complex vitamins capsule Take 1 capsule by mouth daily, Starting Wed 9/6/2023, Normal      benzonatate (TESSALON PERLES) 100 mg capsule Take 1 capsule (100 mg total) by mouth 3 (three) times a day as needed for cough, Starting Fri 9/17/2021, Normal      bisoprolol-hydrochlorothiazide (ZIAC) 5-6.25 MG per tablet AS DIRECTED, Normal      Calcium Carb-Cholecalciferol 600-10 MG-MCG TABS Take 1 tablet by mouth every 12 (twelve) hours, Starting Mon 2/19/2024, Normal      !! calcium carbonate-vitamin D (OSCAL-D) 500 mg-200 units per tablet TAKE ONE TABLET BY MOUTH EVERY 12 HOURS, Normal      !! calcium carbonate-vitamin  D (OSCAL-D) 500 mg-200 units per tablet Take 1 tablet by mouth 2 (two) times a day with meals, Starting Wed 11/17/2021, Normal      calcium-vitamin D (OSCAL) 250-125 MG-UNIT per tablet Take 1 tablet by mouth daily, Starting Tue 9/3/2019, Normal      Cholecalciferol (Vitamin D3) 50 MCG (2000 UT) capsule TAKE ONE CAPSULE BY MOUTH ONCE DAILY, Normal      clotrimazole-betamethasone (LOTRISONE) 1-0.05 % cream APPLY TOPICALLY TO AFFECTED AREA TWICE A DAY, Normal      cyclobenzaprine (FLEXERIL) 5 mg tablet Take 1 tablet (5 mg total) by mouth 3 (three) times a day as needed for muscle spasms for up to 30 doses, Starting Thu 9/15/2022, Normal      Easy Comfort Lancets MISC USE TO TEST THE BLOOD SUGAR TWICE A DAY, Normal      fluticasone (FLONASE) 50 mcg/act nasal spray 1 spray into each nostril daily, Starting Tue 4/7/2020, Normal      FREESTYLE LITE test strip USE TO TEST THE BLOOD SUGAR TWICE A DAY, Normal      loratadine (CLARITIN) 10 mg tablet Take 1 tablet (10 mg total) by mouth daily, Starting Fri 10/29/2021, Normal      loratadine-pseudoephedrine (CLARITIN-D 24-HOUR)  mg per 24 hr tablet Take 1 tablet by mouth daily, Starting Mon 4/3/2023, Normal      magnesium oxide (MAG-OX) 400 mg tablet Take 1 tablet (400 mg total) by mouth daily, Starting Mon 1/13/2020, Normal      meloxicam (MOBIC) 15 mg tablet Take 1 tablet (15 mg total) by mouth daily, Starting Thu 2/4/2021, Normal      methocarbamol (ROBAXIN) 500 mg tablet Take 1 tablet (500 mg total) by mouth 3 (three) times a day, Starting Mon 9/19/2022, Normal      methylPREDNISolone 4 MG tablet therapy pack Use as directed on package, Normal      naproxen (NAPROSYN) 500 mg tablet Take 1 tablet (500 mg total) by mouth 2 (two) times a day with meals, Starting Thu 9/15/2022, Normal      triamcinolone (KENALOG) 0.1 % lotion APPLY TOPICALLY TO AFFECTED AREA THREE TIMES A DAY, Normal      UltiCare Alcohol Swabs 70 % PADS USE AS DIRECTED THREE TIMES A DAY, Normal       Vascepa 1 g CAPS TAKE TWO CAPSULES BY MOUTH TWICE A DAY, Normal      Vitamin D, Ergocalciferol, 50 MCG (2000 UT) CAPS Take 1 capsule by mouth daily, Starting Wed 7/13/2022, Normal       !! - Potential duplicate medications found. Please discuss with provider.              PDMP Review       None            ED Provider  Electronically Signed by             Zeny Barr PA-C  05/26/24 0800

## 2024-06-02 NOTE — Clinical Note
Can a team member from your office please contact the pts nephew to notify him of the pts medication of aspirin and instructions? It is not within my scope of practice to notify pts of clearance of medications they should be taking  Thank you,  Martha Leon DPT  ----- Message -----  From: Babita Aguilera DO  Sent: 1/14/2023   8:37 PM EST  To: Satya Abdullahi PT, Nona Lind MD      She can continue taking ASA 81mg per her primary care physician  She does not require anticoagulation for her upper extremity injury  ----- Message -----  From: Satya Abdullahi PT  Sent: 1/13/2023   3:32 PM EST  To: Nona Lind MD, Babita Aguilera DO    [facility] has admitted your patient to [address] [place] [address] service with the following disciplines:      PT and OT  Pt is declining home OT at this time  Recommend and requesting a MSW order per pts family request for information pertaining to transportation resources    Response needed, please respond via EPIC communication and documentation pertaining to pt question of taking 81 mg aspirin? ??    Primary focus of home health care MUSCULOSKELETAL    Patient stated goals of care TO BE ABLE TO USE THE RIGHT ARM BETTER, FOR RIGHT ARM TO HEAL, TO BE ABLE TO GET OUT OF THE HOME FOR APPOINTMENTS  Anticipated visit pattern 1WK1 AND STARTING WEEK OF 01/15/23 8UX2 AND 0GZ4  See medication list - meds in home differ from AVS  pt reports ortho MD at recent visit verbally reported for pt to take 81 mg aspirin  Did not see on pts current EPIC med list  Please confirm and reach out to pt to confirm  Significant clinical findings inc pain RUE at rest  Potential barriers to goal achievement multiple steps to enter,limited mobility to household at this time with use of SPC, declning home OT at this time 2* only wants one person coming at a time and prefers PT  Other pertinent information pt speaks Lithuanian   Pts nephew will be available during home visits to A with home visit and Pt received via walking rounds resting on cart. Denies any pain, sob, dizziness at this time. Pt  c/o elevated BP and dizziness after eating an marijuana edible.  POC discussed. A/O x 3. Room air. Will continue to monitor.    translation  Thank you for allowing us to participate in the care of your patient        Wm Chowdhury DPT

## 2024-06-09 ENCOUNTER — HOSPITAL ENCOUNTER (OUTPATIENT)
Dept: MRI IMAGING | Facility: HOSPITAL | Age: 74
Discharge: HOME | End: 2024-06-09
Attending: PODIATRIST
Payer: MEDICARE

## 2024-06-09 DIAGNOSIS — S92.902A UNSPECIFIED FRACTURE OF LEFT FOOT, INITIAL ENCOUNTER FOR CLOSED FRACTURE: ICD-10-CM

## 2024-06-09 PROCEDURE — 73718 MRI LOWER EXTREMITY W/O DYE: CPT

## 2024-11-08 ENCOUNTER — LAB (OUTPATIENT)
Dept: LAB | Facility: HOSPITAL | Age: 74
End: 2024-11-08
Payer: MEDICARE

## 2024-11-08 DIAGNOSIS — I10 ESSENTIAL HYPERTENSION: ICD-10-CM

## 2024-11-08 DIAGNOSIS — E78.2 MIXED HYPERLIPIDEMIA: ICD-10-CM

## 2024-11-08 DIAGNOSIS — E53.8 B12 DEFICIENCY: ICD-10-CM

## 2024-11-08 DIAGNOSIS — E55.9 VITAMIN D DEFICIENCY: ICD-10-CM

## 2024-11-08 DIAGNOSIS — R73.9 HYPERGLYCEMIA: ICD-10-CM

## 2024-11-08 LAB
25(OH)D3 SERPL-MCNC: 60.7 NG/ML (ref 30–100)
ALBUMIN SERPL BCG-MCNC: 4 G/DL (ref 3.5–5)
ALP SERPL-CCNC: 51 U/L (ref 34–104)
ALT SERPL W P-5'-P-CCNC: 16 U/L (ref 7–52)
ANION GAP SERPL CALCULATED.3IONS-SCNC: 6 MMOL/L (ref 4–13)
AST SERPL W P-5'-P-CCNC: 18 U/L (ref 13–39)
BASOPHILS # BLD AUTO: 0.06 THOUSANDS/ÂΜL (ref 0–0.1)
BASOPHILS NFR BLD AUTO: 1 % (ref 0–1)
BILIRUB SERPL-MCNC: 1.05 MG/DL (ref 0.2–1)
BUN SERPL-MCNC: 14 MG/DL (ref 5–25)
CALCIUM SERPL-MCNC: 9.4 MG/DL (ref 8.4–10.2)
CHLORIDE SERPL-SCNC: 105 MMOL/L (ref 96–108)
CHOLEST SERPL-MCNC: 187 MG/DL
CK SERPL-CCNC: 97 U/L (ref 26–192)
CO2 SERPL-SCNC: 29 MMOL/L (ref 21–32)
CREAT SERPL-MCNC: 0.58 MG/DL (ref 0.6–1.3)
EOSINOPHIL # BLD AUTO: 0.15 THOUSAND/ÂΜL (ref 0–0.61)
EOSINOPHIL NFR BLD AUTO: 2 % (ref 0–6)
ERYTHROCYTE [DISTWIDTH] IN BLOOD BY AUTOMATED COUNT: 13.1 % (ref 11.6–15.1)
EST. AVERAGE GLUCOSE BLD GHB EST-MCNC: 114 MG/DL
GFR SERPL CREATININE-BSD FRML MDRD: 91 ML/MIN/1.73SQ M
GLUCOSE P FAST SERPL-MCNC: 107 MG/DL (ref 65–99)
HBA1C MFR BLD: 5.6 %
HCT VFR BLD AUTO: 40.6 % (ref 34.8–46.1)
HDLC SERPL-MCNC: 64 MG/DL
HGB BLD-MCNC: 13.1 G/DL (ref 11.5–15.4)
IMM GRANULOCYTES # BLD AUTO: 0.02 THOUSAND/UL (ref 0–0.2)
IMM GRANULOCYTES NFR BLD AUTO: 0 % (ref 0–2)
LDLC SERPL CALC-MCNC: 98 MG/DL (ref 0–100)
LYMPHOCYTES # BLD AUTO: 1.87 THOUSANDS/ÂΜL (ref 0.6–4.47)
LYMPHOCYTES NFR BLD AUTO: 29 % (ref 14–44)
MCH RBC QN AUTO: 29.4 PG (ref 26.8–34.3)
MCHC RBC AUTO-ENTMCNC: 32.3 G/DL (ref 31.4–37.4)
MCV RBC AUTO: 91 FL (ref 82–98)
MONOCYTES # BLD AUTO: 0.46 THOUSAND/ÂΜL (ref 0.17–1.22)
MONOCYTES NFR BLD AUTO: 7 % (ref 4–12)
NEUTROPHILS # BLD AUTO: 3.82 THOUSANDS/ÂΜL (ref 1.85–7.62)
NEUTS SEG NFR BLD AUTO: 61 % (ref 43–75)
NONHDLC SERPL-MCNC: 123 MG/DL
NRBC BLD AUTO-RTO: 0 /100 WBCS
PLATELET # BLD AUTO: 236 THOUSANDS/UL (ref 149–390)
PMV BLD AUTO: 9.8 FL (ref 8.9–12.7)
POTASSIUM SERPL-SCNC: 5 MMOL/L (ref 3.5–5.3)
PROT SERPL-MCNC: 6.5 G/DL (ref 6.4–8.4)
RBC # BLD AUTO: 4.45 MILLION/UL (ref 3.81–5.12)
SODIUM SERPL-SCNC: 140 MMOL/L (ref 135–147)
TRIGL SERPL-MCNC: 123 MG/DL
VIT B12 SERPL-MCNC: 299 PG/ML (ref 180–914)
WBC # BLD AUTO: 6.38 THOUSAND/UL (ref 4.31–10.16)

## 2024-11-08 PROCEDURE — 85025 COMPLETE CBC W/AUTO DIFF WBC: CPT

## 2024-11-08 PROCEDURE — 82550 ASSAY OF CK (CPK): CPT

## 2024-11-08 PROCEDURE — 36415 COLL VENOUS BLD VENIPUNCTURE: CPT

## 2024-11-08 PROCEDURE — 82306 VITAMIN D 25 HYDROXY: CPT

## 2024-11-08 PROCEDURE — 83036 HEMOGLOBIN GLYCOSYLATED A1C: CPT

## 2024-11-08 PROCEDURE — 80061 LIPID PANEL: CPT

## 2024-11-08 PROCEDURE — 82607 VITAMIN B-12: CPT

## 2024-11-08 PROCEDURE — 80053 COMPREHEN METABOLIC PANEL: CPT

## 2025-05-03 ENCOUNTER — APPOINTMENT (OUTPATIENT)
Dept: LAB | Facility: HOSPITAL | Age: 75
End: 2025-05-03
Payer: MEDICARE

## 2025-05-03 ENCOUNTER — HOSPITAL ENCOUNTER (OUTPATIENT)
Dept: RADIOLOGY | Facility: HOSPITAL | Age: 75
Discharge: HOME | End: 2025-05-03
Payer: MEDICARE

## 2025-05-03 DIAGNOSIS — M25.562 PAIN IN BOTH KNEES, UNSPECIFIED CHRONICITY: ICD-10-CM

## 2025-05-03 DIAGNOSIS — E55.9 VITAMIN D DEFICIENCY: ICD-10-CM

## 2025-05-03 DIAGNOSIS — I10 ESSENTIAL HYPERTENSION: ICD-10-CM

## 2025-05-03 DIAGNOSIS — R73.9 HYPERGLYCEMIA: ICD-10-CM

## 2025-05-03 DIAGNOSIS — M25.561 PAIN IN BOTH KNEES, UNSPECIFIED CHRONICITY: ICD-10-CM

## 2025-05-03 LAB
25(OH)D3 SERPL-MCNC: 60.8 NG/ML (ref 30–100)
ALBUMIN SERPL BCG-MCNC: 4 G/DL (ref 3.5–5)
ALP SERPL-CCNC: 50 U/L (ref 34–104)
ALT SERPL W P-5'-P-CCNC: 16 U/L (ref 7–52)
ANION GAP SERPL CALCULATED.3IONS-SCNC: 7 MMOL/L (ref 4–13)
AST SERPL W P-5'-P-CCNC: 17 U/L (ref 13–39)
BASOPHILS # BLD AUTO: 0.05 THOUSANDS/ÂΜL (ref 0–0.1)
BASOPHILS NFR BLD AUTO: 1 % (ref 0–1)
BILIRUB SERPL-MCNC: 1.05 MG/DL (ref 0.2–1)
BUN SERPL-MCNC: 16 MG/DL (ref 5–25)
CALCIUM SERPL-MCNC: 9.1 MG/DL (ref 8.4–10.2)
CHLORIDE SERPL-SCNC: 106 MMOL/L (ref 96–108)
CHOLEST SERPL-MCNC: 189 MG/DL (ref ?–200)
CO2 SERPL-SCNC: 28 MMOL/L (ref 21–32)
CREAT SERPL-MCNC: 0.59 MG/DL (ref 0.6–1.3)
EOSINOPHIL # BLD AUTO: 0.14 THOUSAND/ÂΜL (ref 0–0.61)
EOSINOPHIL NFR BLD AUTO: 2 % (ref 0–6)
ERYTHROCYTE [DISTWIDTH] IN BLOOD BY AUTOMATED COUNT: 13.2 % (ref 11.6–15.1)
EST. AVERAGE GLUCOSE BLD GHB EST-MCNC: 117 MG/DL
GFR SERPL CREATININE-BSD FRML MDRD: 90 ML/MIN/1.73SQ M
GLUCOSE P FAST SERPL-MCNC: 110 MG/DL (ref 65–99)
HBA1C MFR BLD: 5.7 %
HCT VFR BLD AUTO: 39.9 % (ref 34.8–46.1)
HDLC SERPL-MCNC: 63 MG/DL
HGB BLD-MCNC: 12.8 G/DL (ref 11.5–15.4)
IMM GRANULOCYTES # BLD AUTO: 0.02 THOUSAND/UL (ref 0–0.2)
IMM GRANULOCYTES NFR BLD AUTO: 0 % (ref 0–2)
LDLC SERPL CALC-MCNC: 107 MG/DL (ref 0–100)
LYMPHOCYTES # BLD AUTO: 1.98 THOUSANDS/ÂΜL (ref 0.6–4.47)
LYMPHOCYTES NFR BLD AUTO: 31 % (ref 14–44)
MCH RBC QN AUTO: 28.4 PG (ref 26.8–34.3)
MCHC RBC AUTO-ENTMCNC: 32.1 G/DL (ref 31.4–37.4)
MCV RBC AUTO: 89 FL (ref 82–98)
MONOCYTES # BLD AUTO: 0.36 THOUSAND/ÂΜL (ref 0.17–1.22)
MONOCYTES NFR BLD AUTO: 6 % (ref 4–12)
NEUTROPHILS # BLD AUTO: 3.75 THOUSANDS/ÂΜL (ref 1.85–7.62)
NEUTS SEG NFR BLD AUTO: 60 % (ref 43–75)
NONHDLC SERPL-MCNC: 126 MG/DL
NRBC BLD AUTO-RTO: 0 /100 WBCS
PLATELET # BLD AUTO: 211 THOUSANDS/UL (ref 149–390)
PMV BLD AUTO: 9.8 FL (ref 8.9–12.7)
POTASSIUM SERPL-SCNC: 4.3 MMOL/L (ref 3.5–5.3)
PROT SERPL-MCNC: 6.5 G/DL (ref 6.4–8.4)
RBC # BLD AUTO: 4.5 MILLION/UL (ref 3.81–5.12)
SODIUM SERPL-SCNC: 141 MMOL/L (ref 135–147)
TRIGL SERPL-MCNC: 97 MG/DL (ref ?–150)
WBC # BLD AUTO: 6.3 THOUSAND/UL (ref 4.31–10.16)

## 2025-05-03 PROCEDURE — 80053 COMPREHEN METABOLIC PANEL: CPT

## 2025-05-03 PROCEDURE — 85025 COMPLETE CBC W/AUTO DIFF WBC: CPT

## 2025-05-03 PROCEDURE — 73562 X-RAY EXAM OF KNEE 3: CPT

## 2025-05-03 PROCEDURE — 36415 COLL VENOUS BLD VENIPUNCTURE: CPT

## 2025-05-03 PROCEDURE — 80061 LIPID PANEL: CPT

## 2025-05-03 PROCEDURE — 83036 HEMOGLOBIN GLYCOSYLATED A1C: CPT

## 2025-05-03 PROCEDURE — 82306 VITAMIN D 25 HYDROXY: CPT
